# Patient Record
Sex: FEMALE | Race: WHITE | NOT HISPANIC OR LATINO | Employment: FULL TIME | URBAN - METROPOLITAN AREA
[De-identification: names, ages, dates, MRNs, and addresses within clinical notes are randomized per-mention and may not be internally consistent; named-entity substitution may affect disease eponyms.]

---

## 2017-06-26 ENCOUNTER — TRANSCRIBE ORDERS (OUTPATIENT)
Dept: ADMINISTRATIVE | Facility: HOSPITAL | Age: 57
End: 2017-06-26

## 2017-06-26 ENCOUNTER — TRANSCRIBE ORDERS (OUTPATIENT)
Dept: LAB | Facility: HOSPITAL | Age: 57
End: 2017-06-26

## 2017-06-26 ENCOUNTER — APPOINTMENT (OUTPATIENT)
Dept: LAB | Facility: HOSPITAL | Age: 57
End: 2017-06-26

## 2017-06-26 ENCOUNTER — HOSPITAL ENCOUNTER (OUTPATIENT)
Dept: GENERAL RADIOLOGY | Facility: HOSPITAL | Age: 57
Discharge: HOME | End: 2017-06-26
Attending: ORTHOPAEDIC SURGERY | Admitting: ORTHOPAEDIC SURGERY

## 2017-06-26 DIAGNOSIS — R73.09 OTHER ABNORMAL GLUCOSE: ICD-10-CM

## 2017-06-26 DIAGNOSIS — Z87.68 PERSONAL HISTORY OF PERINATAL PROBLEMS: Primary | ICD-10-CM

## 2017-06-26 DIAGNOSIS — Z01.818 PRE-OP EXAM: Primary | ICD-10-CM

## 2017-06-26 DIAGNOSIS — Z01.818 PRE-OP EXAM: ICD-10-CM

## 2017-06-26 DIAGNOSIS — Z01.818 PREOP EXAMINATION: ICD-10-CM

## 2017-06-26 LAB
ANION GAP SERPL CALCULATED.3IONS-SCNC: 9 MMOL/L (ref 3–11)
BUN BLD-MCNC: 12 MG/DL (ref 9–23)
BUN/CREAT SERPL: 20 (ref 7–25)
CALCIUM SPEC-SCNC: 9.6 MG/DL (ref 8.7–10.4)
CHLORIDE SERPL-SCNC: 107 MMOL/L (ref 99–109)
CO2 SERPL-SCNC: 27 MMOL/L (ref 20–31)
CREAT BLD-MCNC: 0.6 MG/DL (ref 0.6–1.3)
DEPRECATED RDW RBC AUTO: 46.9 FL (ref 37–54)
ERYTHROCYTE [DISTWIDTH] IN BLOOD BY AUTOMATED COUNT: 13.6 % (ref 11.3–14.5)
GFR SERPL CREATININE-BSD FRML MDRD: 103 ML/MIN/1.73
GLUCOSE BLD-MCNC: 93 MG/DL (ref 70–100)
HBA1C MFR BLD: 6.1 % (ref 4.8–5.6)
HCT VFR BLD AUTO: 40.2 % (ref 34.5–44)
HGB BLD-MCNC: 13 G/DL (ref 11.5–15.5)
MCH RBC QN AUTO: 30 PG (ref 27–31)
MCHC RBC AUTO-ENTMCNC: 32.3 G/DL (ref 32–36)
MCV RBC AUTO: 92.6 FL (ref 80–99)
PLATELET # BLD AUTO: 206 10*3/MM3 (ref 150–450)
PMV BLD AUTO: 10.1 FL (ref 6–12)
POTASSIUM BLD-SCNC: 4 MMOL/L (ref 3.5–5.5)
RBC # BLD AUTO: 4.34 10*6/MM3 (ref 3.89–5.14)
SODIUM BLD-SCNC: 143 MMOL/L (ref 132–146)
WBC NRBC COR # BLD: 5.72 10*3/MM3 (ref 3.5–10.8)

## 2017-06-26 PROCEDURE — 83036 HEMOGLOBIN GLYCOSYLATED A1C: CPT | Performed by: ORTHOPAEDIC SURGERY

## 2017-06-26 PROCEDURE — 93010 ELECTROCARDIOGRAM REPORT: CPT | Performed by: INTERNAL MEDICINE

## 2017-06-26 PROCEDURE — 80048 BASIC METABOLIC PNL TOTAL CA: CPT | Performed by: ORTHOPAEDIC SURGERY

## 2017-06-26 PROCEDURE — 36415 COLL VENOUS BLD VENIPUNCTURE: CPT | Performed by: ORTHOPAEDIC SURGERY

## 2017-06-26 PROCEDURE — 71020 HC CHEST PA AND LATERAL: CPT

## 2017-06-26 PROCEDURE — 93005 ELECTROCARDIOGRAM TRACING: CPT | Performed by: ORTHOPAEDIC SURGERY

## 2017-06-26 PROCEDURE — 85027 COMPLETE CBC AUTOMATED: CPT | Performed by: ORTHOPAEDIC SURGERY

## 2019-03-01 ENCOUNTER — TELEPHONE (OUTPATIENT)
Dept: INTERNAL MEDICINE | Facility: CLINIC | Age: 59
End: 2019-03-01

## 2019-03-01 RX ORDER — ESTRADIOL 0.05 MG/D
1 FILM, EXTENDED RELEASE TRANSDERMAL 2 TIMES WEEKLY
Qty: 8 PATCH | Refills: 6 | Status: SHIPPED | OUTPATIENT
Start: 2019-03-04 | End: 2019-06-20 | Stop reason: SDUPTHER

## 2019-03-01 RX ORDER — ESTRADIOL 0.05 MG/D
1 FILM, EXTENDED RELEASE TRANSDERMAL 2 TIMES WEEKLY
COMMUNITY
End: 2019-03-01 | Stop reason: SDUPTHER

## 2019-03-01 NOTE — TELEPHONE ENCOUNTER
PT CALLED AND SAID SHE LEFT MESSAGE Wednesday FOR REFILL FOR  ESTRADIOL PATCHES AND SHE IS OUT NOW AND NEEDS THEM TODAY. CALLED IN TO THE [facility] [phone]

## 2019-06-17 PROBLEM — I10 HYPERTENSION: Status: ACTIVE | Noted: 2019-06-17

## 2019-06-17 PROBLEM — Z79.890 HORMONE REPLACEMENT THERAPY: Status: ACTIVE | Noted: 2019-06-17

## 2019-06-17 PROBLEM — R19.6 HALITOSIS: Status: ACTIVE | Noted: 2019-06-17

## 2019-06-17 PROBLEM — E78.5 HYPERLIPIDEMIA: Status: ACTIVE | Noted: 2019-06-17

## 2019-06-17 PROBLEM — M25.511 PAIN OF RIGHT SHOULDER REGION: Status: ACTIVE | Noted: 2019-06-17

## 2019-06-20 ENCOUNTER — LAB (OUTPATIENT)
Dept: LAB | Facility: HOSPITAL | Age: 59
End: 2019-06-20

## 2019-06-20 ENCOUNTER — OFFICE VISIT (OUTPATIENT)
Dept: INTERNAL MEDICINE | Facility: CLINIC | Age: 59
End: 2019-06-20

## 2019-06-20 VITALS
BODY MASS INDEX: 30.37 KG/M2 | TEMPERATURE: 98.1 F | DIASTOLIC BLOOD PRESSURE: 70 MMHG | HEIGHT: 66 IN | SYSTOLIC BLOOD PRESSURE: 138 MMHG | WEIGHT: 189 LBS | HEART RATE: 68 BPM

## 2019-06-20 DIAGNOSIS — R53.83 FATIGUE, UNSPECIFIED TYPE: ICD-10-CM

## 2019-06-20 DIAGNOSIS — E55.9 VITAMIN D DEFICIENCY: ICD-10-CM

## 2019-06-20 DIAGNOSIS — Z00.00 ENCOUNTER FOR PREVENTATIVE ADULT HEALTH CARE EXAMINATION: ICD-10-CM

## 2019-06-20 DIAGNOSIS — Z78.0 POST-MENOPAUSAL: ICD-10-CM

## 2019-06-20 DIAGNOSIS — E78.2 MIXED HYPERLIPIDEMIA: ICD-10-CM

## 2019-06-20 DIAGNOSIS — F51.01 PRIMARY INSOMNIA: ICD-10-CM

## 2019-06-20 DIAGNOSIS — R23.2 HOT FLASHES: ICD-10-CM

## 2019-06-20 DIAGNOSIS — K21.9 GASTROESOPHAGEAL REFLUX DISEASE WITHOUT ESOPHAGITIS: ICD-10-CM

## 2019-06-20 DIAGNOSIS — I10 ESSENTIAL HYPERTENSION: ICD-10-CM

## 2019-06-20 DIAGNOSIS — F41.9 ANXIETY: ICD-10-CM

## 2019-06-20 DIAGNOSIS — E78.2 MIXED HYPERLIPIDEMIA: Primary | ICD-10-CM

## 2019-06-20 DIAGNOSIS — Z79.890 HORMONE REPLACEMENT THERAPY: ICD-10-CM

## 2019-06-20 DIAGNOSIS — E66.9 OBESITY (BMI 30-39.9): ICD-10-CM

## 2019-06-20 LAB
25(OH)D3 SERPL-MCNC: 22.9 NG/ML (ref 30–100)
ALBUMIN SERPL-MCNC: 4.7 G/DL (ref 3.5–5.2)
ALBUMIN/GLOB SERPL: 1.9 G/DL
ALP SERPL-CCNC: 88 U/L (ref 39–117)
ALT SERPL W P-5'-P-CCNC: 17 U/L (ref 1–33)
ANION GAP SERPL CALCULATED.3IONS-SCNC: 13.7 MMOL/L
AST SERPL-CCNC: 17 U/L (ref 1–32)
BASOPHILS # BLD AUTO: 0.05 10*3/MM3 (ref 0–0.2)
BASOPHILS NFR BLD AUTO: 1 % (ref 0–1.5)
BILIRUB SERPL-MCNC: 0.5 MG/DL (ref 0.2–1.2)
BUN BLD-MCNC: 10 MG/DL (ref 6–20)
BUN/CREAT SERPL: 15.9 (ref 7–25)
CALCIUM SPEC-SCNC: 9.5 MG/DL (ref 8.6–10.5)
CHLORIDE SERPL-SCNC: 101 MMOL/L (ref 98–107)
CHOLEST SERPL-MCNC: 133 MG/DL (ref 0–200)
CO2 SERPL-SCNC: 26.3 MMOL/L (ref 22–29)
CREAT BLD-MCNC: 0.63 MG/DL (ref 0.57–1)
DEPRECATED RDW RBC AUTO: 46.8 FL (ref 37–54)
EOSINOPHIL # BLD AUTO: 0.08 10*3/MM3 (ref 0–0.4)
EOSINOPHIL NFR BLD AUTO: 1.6 % (ref 0.3–6.2)
ERYTHROCYTE [DISTWIDTH] IN BLOOD BY AUTOMATED COUNT: 13.4 % (ref 12.3–15.4)
ESTRADIOL SERPL HS-MCNC: 38.1 PG/ML
GFR SERPL CREATININE-BSD FRML MDRD: 97 ML/MIN/1.73
GLOBULIN UR ELPH-MCNC: 2.5 GM/DL
GLUCOSE BLD-MCNC: 98 MG/DL (ref 65–99)
HCT VFR BLD AUTO: 44.2 % (ref 34–46.6)
HDLC SERPL-MCNC: 39 MG/DL (ref 40–60)
HGB BLD-MCNC: 14 G/DL (ref 12–15.9)
IMM GRANULOCYTES # BLD AUTO: 0.02 10*3/MM3 (ref 0–0.05)
IMM GRANULOCYTES NFR BLD AUTO: 0.4 % (ref 0–0.5)
LDLC SERPL CALC-MCNC: 69 MG/DL (ref 0–100)
LDLC/HDLC SERPL: 1.77 {RATIO}
LYMPHOCYTES # BLD AUTO: 1.8 10*3/MM3 (ref 0.7–3.1)
LYMPHOCYTES NFR BLD AUTO: 36.5 % (ref 19.6–45.3)
MCH RBC QN AUTO: 30.2 PG (ref 26.6–33)
MCHC RBC AUTO-ENTMCNC: 31.7 G/DL (ref 31.5–35.7)
MCV RBC AUTO: 95.5 FL (ref 79–97)
MONOCYTES # BLD AUTO: 0.36 10*3/MM3 (ref 0.1–0.9)
MONOCYTES NFR BLD AUTO: 7.3 % (ref 5–12)
NEUTROPHILS # BLD AUTO: 2.62 10*3/MM3 (ref 1.7–7)
NEUTROPHILS NFR BLD AUTO: 53.2 % (ref 42.7–76)
NRBC BLD AUTO-RTO: 0 /100 WBC (ref 0–0.2)
PLATELET # BLD AUTO: 210 10*3/MM3 (ref 140–450)
PMV BLD AUTO: 9.8 FL (ref 6–12)
POTASSIUM BLD-SCNC: 4.1 MMOL/L (ref 3.5–5.2)
PROT SERPL-MCNC: 7.2 G/DL (ref 6–8.5)
RBC # BLD AUTO: 4.63 10*6/MM3 (ref 3.77–5.28)
SODIUM BLD-SCNC: 141 MMOL/L (ref 136–145)
TRIGL SERPL-MCNC: 124 MG/DL (ref 0–150)
TSH SERPL DL<=0.05 MIU/L-ACNC: 1.68 MIU/ML (ref 0.27–4.2)
VIT B12 BLD-MCNC: 381 PG/ML (ref 211–946)
VLDLC SERPL-MCNC: 24.8 MG/DL (ref 5–40)
WBC NRBC COR # BLD: 4.93 10*3/MM3 (ref 3.4–10.8)

## 2019-06-20 PROCEDURE — 84443 ASSAY THYROID STIM HORMONE: CPT

## 2019-06-20 PROCEDURE — 93000 ELECTROCARDIOGRAM COMPLETE: CPT | Performed by: PHYSICIAN ASSISTANT

## 2019-06-20 PROCEDURE — 99396 PREV VISIT EST AGE 40-64: CPT | Performed by: PHYSICIAN ASSISTANT

## 2019-06-20 PROCEDURE — 82043 UR ALBUMIN QUANTITATIVE: CPT

## 2019-06-20 PROCEDURE — 82607 VITAMIN B-12: CPT

## 2019-06-20 PROCEDURE — 80061 LIPID PANEL: CPT

## 2019-06-20 PROCEDURE — 82306 VITAMIN D 25 HYDROXY: CPT

## 2019-06-20 PROCEDURE — 80053 COMPREHEN METABOLIC PANEL: CPT

## 2019-06-20 PROCEDURE — 85025 COMPLETE CBC W/AUTO DIFF WBC: CPT

## 2019-06-20 PROCEDURE — 82670 ASSAY OF TOTAL ESTRADIOL: CPT

## 2019-06-20 RX ORDER — AMOXICILLIN AND CLAVULANATE POTASSIUM 875; 125 MG/1; MG/1
1 TABLET, FILM COATED ORAL 2 TIMES DAILY
COMMUNITY
Start: 2019-06-19 | End: 2019-06-29

## 2019-06-20 RX ORDER — ZOLPIDEM TARTRATE 10 MG/1
0.5 TABLET, FILM COATED ORAL NIGHTLY PRN
COMMUNITY
Start: 2019-05-28

## 2019-06-20 RX ORDER — GLYCOPYRROLATE 1 MG/1
1 TABLET ORAL DAILY
Refills: 0 | COMMUNITY
Start: 2019-06-12 | End: 2021-06-28

## 2019-06-20 RX ORDER — ERGOCALCIFEROL 1.25 MG/1
1 CAPSULE ORAL WEEKLY
Refills: 0 | COMMUNITY
Start: 2019-04-29 | End: 2019-06-25 | Stop reason: SDUPTHER

## 2019-06-20 RX ORDER — HYDROXYZINE PAMOATE 25 MG/1
1 CAPSULE ORAL NIGHTLY
COMMUNITY
Start: 2019-05-24

## 2019-06-20 RX ORDER — ESTRADIOL 0.05 MG/D
1 FILM, EXTENDED RELEASE TRANSDERMAL 2 TIMES WEEKLY
Qty: 8 PATCH | Refills: 11 | Status: SHIPPED | OUTPATIENT
Start: 2019-06-20 | End: 2020-06-26 | Stop reason: SDUPTHER

## 2019-06-20 RX ORDER — CARVEDILOL 6.25 MG/1
1 TABLET ORAL DAILY
Refills: 1 | COMMUNITY
Start: 2019-06-17

## 2019-06-20 RX ORDER — FLUTICASONE PROPIONATE 50 MCG
2 SPRAY, SUSPENSION (ML) NASAL DAILY
Qty: 1 BOTTLE | Refills: 6 | Status: SHIPPED | OUTPATIENT
Start: 2019-06-20 | End: 2020-06-25

## 2019-06-20 RX ORDER — AMLODIPINE BESYLATE 5 MG/1
1 TABLET ORAL DAILY PRN
Refills: 0 | COMMUNITY
Start: 2019-06-10

## 2019-06-20 RX ORDER — FLUCONAZOLE 150 MG/1
1 TABLET ORAL AS NEEDED
COMMUNITY
Start: 2019-06-19 | End: 2021-06-28

## 2019-06-20 RX ORDER — ATORVASTATIN CALCIUM 80 MG/1
1 TABLET, FILM COATED ORAL NIGHTLY
Refills: 1 | COMMUNITY
Start: 2019-06-10

## 2019-06-20 RX ORDER — PAROXETINE 30 MG/1
1 TABLET, FILM COATED ORAL DAILY
COMMUNITY
Start: 2019-06-18 | End: 2020-06-26 | Stop reason: SDUPTHER

## 2019-06-20 NOTE — PATIENT INSTRUCTIONS

## 2019-06-20 NOTE — PROGRESS NOTES
Patient Care Team:  Kathryn Gillespie PA-C as PCP - General (Internal Medicine)    Chief Complaint::   Chief Complaint   Patient presents with   • Annual Exam     Patient is fasting         Subjective     HPI      Pt is here for annual physical.  She also sees Dr. Garcia in [place].   She has had colonoscopy 1/2019 with Dr. Victor Hugo Ospina.  She had recurrent sinusitis in the spring and had CT of sinuses, which showed 5 x 7mm polyp in the right maxillary sinus.  She took fluticasone and symptoms resolved completely.     LMP 2-3 years ago. Last pap and pelvic exam was 2018.   She is scheduled for MMG today.  She is due for DEXA now. We had switched high dose OCP to estradiol patch and oral progesterone.  This has worked well until recently for hot flashes.  She now has increase in hot flashes during the day.  We will check labs today.  She has had increase in weight.     Last year, discussed new onset of halitosis.  Pt describes as severe.  She has had full dental and ENT workup.  She reports an increase in stomach acid and reports a sour taste in her mouth.      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        Hypertension   This is a recurrent problem. The current episode started more than 1 year ago. The problem has been gradually improving since onset. Pertinent negatives include no blurred vision, chest pain, palpitations, peripheral edema or shortness of breath. Agents associated with hypertension include estrogens. Risk factors for coronary artery disease include obesity, dyslipidemia, post-menopausal state and sedentary lifestyle. Past treatments include direct vasodilators and beta blockers. Current antihypertension treatment includes direct vasodilators and beta blockers. The current treatment provides mild improvement. Compliance problems include diet and exercise.    Hyperlipidemia   This is a chronic problem. The current episode started more than 1 year ago. The problem  is controlled. Recent lipid tests were reviewed and are variable. Exacerbating diseases include obesity. Factors aggravating her hyperlipidemia include estrogen. Pertinent negatives include no chest pain, myalgias or shortness of breath. Current antihyperlipidemic treatment includes statins. The current treatment provides moderate improvement of lipids. Compliance problems include adherence to exercise and adherence to diet.  Risk factors for coronary artery disease include hypertension, obesity, a sedentary lifestyle, post-menopausal and dyslipidemia.       [facility]  The following portions of the patient's history were reviewed and updated as appropriate: allergies, current medications, past family history, past medical history, past social history, past surgical history and problem list.    Review of Systems:   Review of Systems   Constitutional: Positive for fatigue. Negative for activity change, appetite change, chills, diaphoresis, fever, unexpected weight gain and unexpected weight loss.   HENT: Positive for ear pain. Negative for congestion, hearing loss, sinus pressure and sore throat.    Eyes: Negative for blurred vision, double vision and visual disturbance.   Respiratory: Negative for cough, chest tightness, shortness of breath and wheezing.    Cardiovascular: Negative for chest pain, palpitations and leg swelling.   Gastrointestinal: Negative for abdominal pain, blood in stool, constipation, GERD and indigestion.   Endocrine: Negative for cold intolerance and heat intolerance.   Genitourinary: Negative for dysuria, hematuria, vaginal bleeding, breast lump and breast pain.   Musculoskeletal: Negative for arthralgias and myalgias.   Skin: Negative for color change and skin lesions.   Allergic/Immunologic: Negative for environmental allergies and food allergies.   Neurological: Negative for dizziness, tremors, seizures, syncope, speech difficulty, weakness, headache, memory problem and confusion.  "  Hematological: Negative for adenopathy. Does not bruise/bleed easily.   Psychiatric/Behavioral: Negative for decreased concentration, sleep disturbance and depressed mood. The patient is not nervous/anxious.        Vital Signs  Vitals:    06/20/19 1044 06/20/19 1159   BP: 142/78 138/70   BP Location: Left arm    Patient Position: Sitting    Cuff Size: Adult    Pulse: 68    Temp: 98.1 °F (36.7 °C)    TempSrc: Temporal    Weight: 85.7 kg (189 lb)    Height: 168.5 cm (66.34\")    PainSc: 0-No pain        Labs  No visits with results within 3 Month(s) from this visit.   Latest known visit with results is:   Transcribe Orders on 06/26/2017   Component Date Value Ref Range Status   • WBC 06/26/2017 5.72  3.50 - 10.80 10*3/mm3 Final   • RBC 06/26/2017 4.34  3.89 - 5.14 10*6/mm3 Final   • Hemoglobin 06/26/2017 13.0  11.5 - 15.5 g/dL Final   • Hematocrit 06/26/2017 40.2  34.5 - 44.0 % Final   • MCV 06/26/2017 92.6  80.0 - 99.0 fL Final   • MCH 06/26/2017 30.0  27.0 - 31.0 pg Final   • MCHC 06/26/2017 32.3  32.0 - 36.0 g/dL Final   • RDW 06/26/2017 13.6  11.3 - 14.5 % Final   • RDW-SD 06/26/2017 46.9  37.0 - 54.0 fl Final   • MPV 06/26/2017 10.1  6.0 - 12.0 fL Final   • Platelets 06/26/2017 206  150 - 450 10*3/mm3 Final   • Hemoglobin A1C 06/26/2017 6.10* 4.80 - 5.60 % Final   • Glucose 06/26/2017 93  70 - 100 mg/dL Final   • BUN 06/26/2017 12  9 - 23 mg/dL Final   • Creatinine 06/26/2017 0.60  0.60 - 1.30 mg/dL Final   • Sodium 06/26/2017 143  132 - 146 mmol/L Final   • Potassium 06/26/2017 4.0  3.5 - 5.5 mmol/L Final   • Chloride 06/26/2017 107  99 - 109 mmol/L Final   • CO2 06/26/2017 27.0  20.0 - 31.0 mmol/L Final   • Calcium 06/26/2017 9.6  8.7 - 10.4 mg/dL Final   • eGFR Non African Amer 06/26/2017 103  >60 mL/min/1.73 Final   • BUN/Creatinine Ratio 06/26/2017 20.0  7.0 - 25.0 Final   • Anion Gap 06/26/2017 9.0  3.0 - 11.0 mmol/L Final       Imaging  All imaging past 24 hours:   Imaging Results (last 24 hours)     ** " No results found for the last 24 hours. **            Current Outpatient Medications:   •  AMBIEN 10 MG tablet, Take 0.5 tablets by mouth At Night As Needed., Disp: , Rfl:   •  amLODIPine (NORVASC) 5 MG tablet, Take 1 tablet by mouth Daily As Needed., Disp: , Rfl: 0  •  amoxicillin-clavulanate (AUGMENTIN) 875-125 MG per tablet, Take 1 tablet by mouth 2 (Two) Times a Day., Disp: , Rfl:   •  atorvastatin (LIPITOR) 80 MG tablet, Take 1 tablet by mouth Every Night., Disp: , Rfl: 1  •  carvedilol (COREG) 6.25 MG tablet, Take 1 tablet by mouth Daily., Disp: , Rfl: 1  •  estradiol (MINIVELLE, VIVELLE-DOT) 0.05 MG/24HR patch, Place 1 patch on the skin as directed by provider 2 (Two) Times a Week., Disp: 8 patch, Rfl: 11  •  fluconazole (DIFLUCAN) 150 MG tablet, Take 1 tablet by mouth As Needed., Disp: , Rfl:   •  hydrOXYzine pamoate (VISTARIL) 25 MG capsule, Take 1 capsule by mouth Every Night., Disp: , Rfl:   •  PARoxetine (PAXIL) 30 MG tablet, Take 1 tablet by mouth Daily., Disp: , Rfl:   •  progesterone (PROMETRIUM) 200 MG capsule, Take 1 capsule by mouth Daily., Disp: 30 capsule, Rfl: 11  •  vitamin D (ERGOCALCIFEROL) 28896 units capsule capsule, Take 1 capsule by mouth 1 (One) Time Per Week., Disp: , Rfl: 0  •  fluticasone (FLONASE) 50 MCG/ACT nasal spray, 2 sprays into the nostril(s) as directed by provider Daily., Disp: 1 bottle, Rfl: 6  •  glycopyrrolate (ROBINUL) 1 MG tablet, Take 1 mg by mouth Daily., Disp: , Rfl: 0    Physical Exam:    Physical Exam   Constitutional: She is oriented to person, place, and time. She appears well-developed and well-nourished.   HENT:   Head: Normocephalic and atraumatic.   Right Ear: Hearing, tympanic membrane, external ear and ear canal normal.   Left Ear: Hearing, tympanic membrane, external ear and ear canal normal.   Nose: Nose normal.   Mouth/Throat: Uvula is midline, oropharynx is clear and moist and mucous membranes are normal.   Eyes: Conjunctivae, EOM and lids are normal.  Pupils are equal, round, and reactive to light.   Neck: Normal range of motion and full passive range of motion without pain. Neck supple.   Cardiovascular: Normal rate, regular rhythm, normal heart sounds and intact distal pulses.   Pulmonary/Chest: Effort normal and breath sounds normal.   Abdominal: Soft. Normal appearance and bowel sounds are normal.   Musculoskeletal: Normal range of motion. She exhibits no tenderness.   Neurological: She is alert and oriented to person, place, and time. She has normal strength and normal reflexes.   Skin: Skin is warm, dry and intact.   Psychiatric: She has a normal mood and affect. Her speech is normal and behavior is normal. Judgment and thought content normal. Cognition and memory are normal.   Vitals reviewed.        ECG 12 Lead  Date/Time: 6/20/2019 12:07 PM  Performed by: Kathryn Gillespie PA-C  Authorized by: Kathryn Gillespie PA-C   Comparison: compared with previous ECG from 6/27/2018  Similar to previous ECG  Rhythm: sinus rhythm  Rate: normal  Conduction: conduction normal    Clinical impression: normal ECG                Assessment/Plan   Problem List Items Addressed This Visit        Cardiovascular and Mediastinum    Hyperlipidemia - Primary    Relevant Medications    atorvastatin (LIPITOR) 80 MG tablet    Other Relevant Orders    Lipid Panel    Hypertension    Relevant Medications    amLODIPine (NORVASC) 5 MG tablet    carvedilol (COREG) 6.25 MG tablet    Other Relevant Orders    ECG 12 Lead    MicroAlbumin, Urine, Random - Urine, Clean Catch       Digestive    Obesity (BMI 30-39.9)    Current Assessment & Plan     BMI chart printed.  Discussed increasing exercise and following a low fat low calorie diet.         Vitamin D deficiency    Relevant Medications    vitamin D (ERGOCALCIFEROL) 80507 units capsule capsule       Other    Hormone replacement therapy    Overview     Changed         Relevant Medications    estradiol (MINIVELLE, VIVELLE-DOT) 0.05  MG/24HR patch    progesterone (PROMETRIUM) 200 MG capsule    Encounter for preventative adult health care examination    Current Assessment & Plan     MMG 6/20/2019  Colonoscopy 1/2019  DEXA ordered today  Hep A series completed in July 2018         Relevant Orders    ECG 12 Lead    Anxiety    Relevant Medications    PARoxetine (PAXIL) 30 MG tablet    Primary insomnia    Relevant Medications    hydrOXYzine pamoate (VISTARIL) 25 MG capsule    AMBIEN 10 MG tablet      Other Visit Diagnoses     Fatigue, unspecified type        Relevant Orders    CBC & Differential    Vitamin D 25 Hydroxy    Vitamin B12    Hot flashes        Labs today.  Consider adding estroven if labs are within normal range.    Relevant Orders    Comprehensive Metabolic Panel    TSH    Estradiol    Post-menopausal        DEXA ordered today    Relevant Orders    DEXA Bone Density Axial    Gastroesophageal reflux disease without esophagitis        Pt has had ENT workup and dental workup for halitosis.  She has acid reflux-trial of omeprazole.    Relevant Medications    glycopyrrolate (ROBINUL) 1 MG tablet          Return in about 1 year (around 6/20/2020) for Annual physical.    Plan of care reviewed with patient at the conclusion of today's visit. Education was provided regarding diagnosis, management, and any prescribed or recommended OTC medications.Patient verbalizes understanding of and agreement with management plan.     Kathryn Gillespie PA-C

## 2019-06-21 LAB — ALBUMIN UR-MCNC: <1.2 MG/L

## 2019-06-25 DIAGNOSIS — E55.9 VITAMIN D DEFICIENCY: ICD-10-CM

## 2019-06-25 RX ORDER — ERGOCALCIFEROL 1.25 MG/1
50000 CAPSULE ORAL WEEKLY
Qty: 4 CAPSULE | Refills: 3 | Status: SHIPPED | OUTPATIENT
Start: 2019-06-25 | End: 2019-10-11 | Stop reason: SDUPTHER

## 2019-07-23 ENCOUNTER — OFFICE VISIT (OUTPATIENT)
Dept: ORTHOPEDIC SURGERY | Facility: CLINIC | Age: 59
End: 2019-07-23

## 2019-07-23 VITALS — HEART RATE: 80 BPM | WEIGHT: 188.93 LBS | BODY MASS INDEX: 30.36 KG/M2 | OXYGEN SATURATION: 98 % | HEIGHT: 66 IN

## 2019-07-23 DIAGNOSIS — M79.645 THUMB PAIN, LEFT: Primary | ICD-10-CM

## 2019-07-23 PROCEDURE — 99214 OFFICE O/P EST MOD 30 MIN: CPT | Performed by: PHYSICIAN ASSISTANT

## 2019-07-23 PROCEDURE — 20600 DRAIN/INJ JOINT/BURSA W/O US: CPT | Performed by: PHYSICIAN ASSISTANT

## 2019-07-23 RX ADMIN — BETAMETHASONE SODIUM PHOSPHATE AND BETAMETHASONE ACETATE 3 MG: 3; 3 INJECTION, SUSPENSION INTRA-ARTICULAR; INTRALESIONAL; INTRAMUSCULAR; SOFT TISSUE at 14:36

## 2019-07-23 RX ADMIN — LIDOCAINE HYDROCHLORIDE 0.5 ML: 10 INJECTION, SOLUTION EPIDURAL; INFILTRATION; INTRACAUDAL; PERINEURAL at 14:36

## 2019-07-23 NOTE — PROGRESS NOTES
Procedure   Small Joint Arthrocentesis: L thumb CMC  Consent given by: patient  Site marked: site marked  Timeout: Immediately prior to procedure a time out was called to verify the correct patient, procedure, equipment, support staff and site/side marked as required   Supporting Documentation  Indications: pain   Procedure Details  Location: thumb - L thumb CMC  Preparation: Patient was prepped and draped in the usual sterile fashion  Needle size: 25 G  Approach: dorsal  Medications administered: 3 mg betamethasone acetate-betamethasone sodium phosphate 6 (3-3) MG/ML; 0.5 mL lidocaine PF 1% 1 %  Patient tolerance: patient tolerated the procedure well with no immediate complications

## 2019-07-23 NOTE — PROGRESS NOTES
[facility] [place] [facility] Clinic Note    Subjective     Chief Complaint   Patient presents with   • Left Hand - Pain        HPI  Jami Pennington is a 59 y.o. female.  Right-hand-dominant.  Patient presents orthopedic clinic for evaluation of left thumb pain.  She states is been ongoing for about 2 months.  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  She also notes positive nocturnal symptoms with throbbing.  At this time pain is 5 out of 10.  Severity the pain moderate.  Quality the pain throbbing.  No numbness or tingling.  No reported fever, chills, night sweats or other constitutional symptoms.  Only treatment has been use of anti-inflammatories.  Patient denies any locking or triggering to the thumb.      Past Medical History:   Diagnosis Date   • Hyperlipidemia     Per MD office progress note on 6/10/16   • Hypertension     Per MD office progress note on 6/10/16   • Overweight (BMI 25.0-29.9)     Per BMI and MD office note 6/10/16      Past Surgical History:   Procedure Laterality Date   • GALLBLADDER SURGERY     • SHOULDER SURGERY Left    • TONSILLECTOMY     • WRIST SURGERY Bilateral     CTR      Family History   Problem Relation Age of Onset   • No Known Problems Mother    • No Known Problems Father      Social History     Socioeconomic History   • Marital status:      Spouse name: Not on file   • Number of children: Not on file   • Years of education: Not on file   • Highest education level: Not on file   Tobacco Use   • Smoking status: Never Smoker   • Smokeless tobacco: Never Used   Substance and Sexual Activity   • Alcohol use: No   • Drug use: No      Current Outpatient Medications on File Prior to Visit   Medication Sig Dispense Refill   • AMBIEN 10 MG tablet Take 0.5 tablets by mouth At Night As Needed.     • amLODIPine (NORVASC) 5 MG tablet Take 1 tablet by mouth Daily  "As Needed.  0   • atorvastatin (LIPITOR) 80 MG tablet Take 1 tablet by mouth Every Night.  1   • carvedilol (COREG) 6.25 MG tablet Take 1 tablet by mouth Daily.  1   • estradiol (MINIVELLE, VIVELLE-DOT) 0.05 MG/24HR patch Place 1 patch on the skin as directed by provider 2 (Two) Times a Week. 8 patch 11   • fluconazole (DIFLUCAN) 150 MG tablet Take 1 tablet by mouth As Needed.     • fluticasone (FLONASE) 50 MCG/ACT nasal spray 2 sprays into the nostril(s) as directed by provider Daily. 1 bottle 6   • glycopyrrolate (ROBINUL) 1 MG tablet Take 1 mg by mouth Daily.  0   • hydrOXYzine pamoate (VISTARIL) 25 MG capsule Take 1 capsule by mouth Every Night.     • PARoxetine (PAXIL) 30 MG tablet Take 1 tablet by mouth Daily.     • progesterone (PROMETRIUM) 200 MG capsule Take 1 capsule by mouth Daily. 30 capsule 11   • vitamin D (ERGOCALCIFEROL) 65002 units capsule capsule Take 1 capsule by mouth 1 (One) Time Per Week. 4 capsule 3     No current facility-administered medications on file prior to visit.       Allergies   Allergen Reactions   • Ceftriaxone Sodium And Nacl Anaphylaxis and Shortness Of Breath     Rocephin   • Levofloxacin Anaphylaxis and Shortness Of Breath     Levaquin         The following portions of the patient's history were reviewed and updated as appropriate: allergies, current medications, past family history, past medical history, past social history, past surgical history and problem list.    Review of Systems   Constitutional: Negative.    HENT: Negative.    Eyes: Negative.    Respiratory: Negative.    Cardiovascular: Negative.    Gastrointestinal: Negative.    Endocrine: Negative.    Genitourinary: Negative.    Musculoskeletal: Positive for arthralgias.   Skin: Negative.    Allergic/Immunologic: Negative.    Neurological: Negative.    Hematological: Negative.    Psychiatric/Behavioral: Negative.         Objective      Physical Exam  Pulse 80   Ht 168.5 cm (66.34\")   Wt 85.7 kg (188 lb 15 oz)   " SpO2 98%   BMI 30.18 kg/m²     Body mass index is 30.18 kg/m².    GENERAL APPEARANCE: awake, alert & oriented x 3, in no acute distress and well developed, well nourished  PSYCH: normal mood and affect  LUNGS:  breathing nonlabored, no wheezing  EYES: sclera anicteric, pupils equal  CARDIOVASCULAR: palpable pulses dorsalis pedis, palpable posterior tibial bilaterally. Capillary refill less than 2 seconds  INTEGUMENTARY: skin intact, no clubbing, cyanosis  NEUROLOGIC:  Normal Sensation and reflexes         Ortho Exam  Peripheral Vascular   Bilateral Upper Extremity    No cyanotic nail beds    Pink nail beds and rapid capillary refill   Palpation    Radial Pulse - Bilaterally normal    Neurologic   Sensory: Light touch intact- Right and left hand    Left Upper Extremity    Left wrist extensors: 5/5    Left wrist flexors: 5/5    Left intrinsics: 5/5   Right Upper Extremity    Right wrist extensors: 5/5    Right wrist flexors: 5/5    Right intrinsics: 5/5    Musculoskeletal     Inspection and Palpation   Left Wrist      Tenderness -positive CMC, dorsum MCP and mild discomfort noted IPJ     Swelling - none    Crepitus - none    Muscle tone - no atrophy     Functional Testing:     Left Wrist and Thumb    Finklestein's: Negative    CMC shuck: Negative    CMC grind: Positive    CMC tenderness: Positive    A1 pulley: Very slight discomfort with more discomfort noted to the dorsum of the MCP.         Strength and Tone    Right  strength: good    Left  strength: good      Imaging/Studies  Imaging Results (last 7 days)     Procedure Component Value Units Date/Time    XR Hand 3+ View Left [[id number]] Resulted:  07/23/19 1436     Updated:  07/23/19 1440    Narrative:       Left Hand X-Ray    Indication: Pain    Views:  AP, Lateral, and Oblique     Comparison: none    Findings:  No fracture  Lucency seen at base of thumb distal phalanx.  Well circumscribed without   periosteal or soft tissue reaction.  Clinical  correlation is recommended.      Normal soft tissues  Normal joint spaces    Impression: See above for full discussion.            Ordered plain film imaging of the left hand.  Images reviewed by Dr. Hinton.  See chart for official results.      Assessment/Plan        ICD-10-CM ICD-9-CM   1. Thumb pain, left M79.645 729.5       Orders Placed This Encounter   Procedures   • Small Joint Arthrocentesis: L thumb CMC   • XR Hand 3+ View Left        -Left thumb pain especially at the CMC>MCP>IP.  -Offered and accepted a first CMC joint corticosteroid injection.  Injection given today.    -Depending on her results from this we may consider A1 pulley injection at a later date.  -Recommend she continue with over-the-counter pain medication as needed.  -Patient is currently utilizing Advil.  -Patient was provided a hand-based neoprene thumb spica brace today.  -Follow-up in 4 weeks for repeat evaluation, sooner if issues arise.  If she has continued pain to the without any change in symptomology may consider further imaging or additional injections.  -Questions and concerns answered.    After discussing the risks, benefits, indications of injection, the patient gave consent to proceed.  Her left thumb CMC joint was confirmed as the correct site to be injected with a timeout.  It was then prepped using Hibiclens and injected with a mixture of 1/2 cc of 1% plain lidocaine and 1/2 cc of Celestone without any resistance through the radial dorsal approach CMC joint, patient in seated position.  Area was cleaned, hemostasis was achieved and a Band-Aid was applied over the injection site.  The patient tolerated procedure well.  I instructed the patient on signs and symptoms of infection.  They should report to the emergency department or return to clinic if any of these develop, for further evaluation and treatment.  Recommended modifying activity for the next 48 hours to include rest, ice, elevation and oral pain medication as  needed.        Medical Decision Making  Management Options : over-the-counter medicine and prescription/IM medicine  Data/Risk: radiology tests       Michelle Amin PA-C  07/24/19  1:10 PM         EMR Dragon/Transcription disclaimer:  Much of this encounter note is an electronic transcription of spoken language to printed text. Electronic transcription of spoken language may permit erroneous, or at times, nonsensical words or phrases to be inadvertently transcribed. Although I have reviewed the note for such errors, some may still exist.

## 2019-07-24 RX ORDER — LIDOCAINE HYDROCHLORIDE 10 MG/ML
0.5 INJECTION, SOLUTION EPIDURAL; INFILTRATION; INTRACAUDAL; PERINEURAL
Status: COMPLETED | OUTPATIENT
Start: 2019-07-23 | End: 2019-07-23

## 2019-07-24 RX ORDER — BETAMETHASONE SODIUM PHOSPHATE AND BETAMETHASONE ACETATE 3; 3 MG/ML; MG/ML
3 INJECTION, SUSPENSION INTRA-ARTICULAR; INTRALESIONAL; INTRAMUSCULAR; SOFT TISSUE
Status: COMPLETED | OUTPATIENT
Start: 2019-07-23 | End: 2019-07-23

## 2019-08-14 ENCOUNTER — APPOINTMENT (OUTPATIENT)
Dept: BONE DENSITY | Facility: HOSPITAL | Age: 59
End: 2019-08-14

## 2019-08-29 ENCOUNTER — APPOINTMENT (OUTPATIENT)
Dept: BONE DENSITY | Facility: HOSPITAL | Age: 59
End: 2019-08-29

## 2019-09-03 ENCOUNTER — OFFICE VISIT (OUTPATIENT)
Dept: ORTHOPEDIC SURGERY | Facility: CLINIC | Age: 59
End: 2019-09-03

## 2019-09-03 VITALS — HEART RATE: 74 BPM | HEIGHT: 66 IN | BODY MASS INDEX: 30.47 KG/M2 | WEIGHT: 189.6 LBS | OXYGEN SATURATION: 98 %

## 2019-09-03 DIAGNOSIS — M25.649 THUMB JOINT STIFFNESS: ICD-10-CM

## 2019-09-03 DIAGNOSIS — M79.645 PAIN OF LEFT THUMB: Primary | ICD-10-CM

## 2019-09-03 DIAGNOSIS — M25.642 DECREASED RANGE OF MOTION OF LEFT THUMB: ICD-10-CM

## 2019-09-03 PROCEDURE — 20550 NJX 1 TENDON SHEATH/LIGAMENT: CPT | Performed by: PHYSICIAN ASSISTANT

## 2019-09-03 PROCEDURE — 99213 OFFICE O/P EST LOW 20 MIN: CPT | Performed by: PHYSICIAN ASSISTANT

## 2019-09-03 RX ADMIN — LIDOCAINE HYDROCHLORIDE 0.5 ML: 10 INJECTION, SOLUTION EPIDURAL; INFILTRATION; INTRACAUDAL; PERINEURAL at 15:57

## 2019-09-03 RX ADMIN — BETAMETHASONE SODIUM PHOSPHATE AND BETAMETHASONE ACETATE 3 MG: 3; 3 INJECTION, SUSPENSION INTRA-ARTICULAR; INTRALESIONAL; INTRAMUSCULAR; SOFT TISSUE at 15:57

## 2019-09-03 NOTE — PROGRESS NOTES
Procedure   Small Joint Arthrocentesis  Consent given by: patient  Site marked: site marked  Timeout: Immediately prior to procedure a time out was called to verify the correct patient, procedure, equipment, support staff and site/side marked as required   Supporting Documentation  Indications: pain   Procedure Details  Location: Left A1 Pulley   Preparation: Patient was prepped and draped in the usual sterile fashion  Needle size: 25 G  Approach: medial  Medications administered: 0.5 mL lidocaine PF 1% 1 %; 3 mg betamethasone acetate-betamethasone sodium phosphate 6 (3-3) MG/ML  Patient tolerance: patient tolerated the procedure well with no immediate complications

## 2019-09-05 RX ORDER — BETAMETHASONE SODIUM PHOSPHATE AND BETAMETHASONE ACETATE 3; 3 MG/ML; MG/ML
3 INJECTION, SUSPENSION INTRA-ARTICULAR; INTRALESIONAL; INTRAMUSCULAR; SOFT TISSUE
Status: COMPLETED | OUTPATIENT
Start: 2019-09-03 | End: 2019-09-03

## 2019-09-05 RX ORDER — LIDOCAINE HYDROCHLORIDE 10 MG/ML
0.5 INJECTION, SOLUTION EPIDURAL; INFILTRATION; INTRACAUDAL; PERINEURAL
Status: COMPLETED | OUTPATIENT
Start: 2019-09-03 | End: 2019-09-03

## 2019-10-11 DIAGNOSIS — E55.9 VITAMIN D DEFICIENCY: ICD-10-CM

## 2019-10-11 RX ORDER — ERGOCALCIFEROL 1.25 MG/1
CAPSULE ORAL
Qty: 4 CAPSULE | Refills: 3 | Status: SHIPPED | OUTPATIENT
Start: 2019-10-11 | End: 2020-06-26 | Stop reason: SDUPTHER

## 2019-11-05 ENCOUNTER — OFFICE VISIT (OUTPATIENT)
Dept: ORTHOPEDIC SURGERY | Facility: CLINIC | Age: 59
End: 2019-11-05

## 2019-11-05 VITALS — HEART RATE: 77 BPM | BODY MASS INDEX: 31.66 KG/M2 | WEIGHT: 197 LBS | HEIGHT: 66 IN | OXYGEN SATURATION: 97 %

## 2019-11-05 DIAGNOSIS — M79.645 PAIN OF LEFT THUMB: Primary | ICD-10-CM

## 2019-11-05 DIAGNOSIS — M65.312 TRIGGER FINGER OF LEFT THUMB: ICD-10-CM

## 2019-11-05 PROCEDURE — 99212 OFFICE O/P EST SF 10 MIN: CPT | Performed by: PHYSICIAN ASSISTANT

## 2019-11-05 NOTE — PROGRESS NOTES
"    [facility]opa[facility] Clinic Note    Subjective     CC: Follow-up (9 week recheck - Pain of left thumb; thumb joint stiffness; decreased ROM Left thumb)      VAMSHI Pennington is a 59 y.o. female.  She returns today for follow-up of her left thumb.  She had undergone left thumb first CMC corticosteroid injection 7/23/2019 followed by a left thumb A1 pulley injection 9/3/2019.  She reports that the second injection provided significant amount of relief.  Even though she never complained of true locking or triggering she did feel some popping and thickening along the A1 pulley which improved with the above-stated injection.  Currently she endorses a pain scale 2/10.  Severity the pain mild.  Quality the pain dull.  She is currently using Advil as needed for pain control.      ROS:    Constiutional:Pt denies fever, chills, nausea, or vomiting.  MSK:as above    Objective      Past Medical History  Past Medical History:   Diagnosis Date   • Hyperlipidemia     Per MD office progress note on 6/10/16   • Hypertension     Per MD office progress note on 6/10/16   • Overweight (BMI 25.0-29.9)     Per BMI and MD office note 6/10/16         Physical Exam  Pulse 77   Ht 168.5 cm (66.34\")   Wt 89.4 kg (197 lb)   SpO2 97%   BMI 31.47 kg/m²     Body mass index is 31.47 kg/m².    Patient is well nourished and well developed.        Ortho Exam  Left thumb  Skin: Grossly intact without any redness warmth or swelling.  Tenderness: Mild discomfort noted along the A1 pulley.  Negative pain noted first CMC joint.  Motion thumb: Patient has better flexion extension thumb CMC and IP joints today than what she had previously with decrease stiffness noted.  Testing: Finkelstein's negative, CMC grind negative, mild thickening noted thumb A1 pulley no catching or locking today.  Motor/sensory: Grossly intact C5-T1.        Imaging/Labs/EMG Reviewed:  No new imaging today.    Assessment:  1. Pain of left thumb    2. Trigger finger of " left thumb        Plan:  1. Left thumb pain due to stiffness along A1 pulley/trigger digit.  2. No injection today as patient is still doing well.  3. Continue with hand home exercise sheet.    4. Recommend over-the-counter pain medication as needed.  5. Will see her back in 6 weeks for possible repeat injection depending on how she is doing.  Or discussion of definitive treatment, trigger release.  6. Questions and concerns answered.      Michelle Amin PA-C  11/07/19  12:23 PM

## 2019-11-14 ENCOUNTER — APPOINTMENT (OUTPATIENT)
Dept: BONE DENSITY | Facility: HOSPITAL | Age: 59
End: 2019-11-14

## 2019-12-17 ENCOUNTER — OFFICE VISIT (OUTPATIENT)
Dept: ORTHOPEDIC SURGERY | Facility: CLINIC | Age: 59
End: 2019-12-17

## 2019-12-17 VITALS — BODY MASS INDEX: 31.5 KG/M2 | HEIGHT: 66 IN | OXYGEN SATURATION: 96 % | HEART RATE: 83 BPM | WEIGHT: 196 LBS

## 2019-12-17 DIAGNOSIS — S90.01XA CONTUSION OF RIGHT ANKLE, INITIAL ENCOUNTER: ICD-10-CM

## 2019-12-17 DIAGNOSIS — M25.571 ACUTE RIGHT ANKLE PAIN: Primary | ICD-10-CM

## 2019-12-17 DIAGNOSIS — S93.421A SPRAIN OF DELTOID LIGAMENT OF RIGHT ANKLE, INITIAL ENCOUNTER: ICD-10-CM

## 2019-12-17 PROCEDURE — 99213 OFFICE O/P EST LOW 20 MIN: CPT | Performed by: PHYSICIAN ASSISTANT

## 2019-12-17 RX ORDER — MELOXICAM 15 MG/1
TABLET ORAL
Qty: 90 TABLET | Refills: 2 | Status: SHIPPED | OUTPATIENT
Start: 2019-12-17 | End: 2020-06-25

## 2019-12-17 NOTE — PROGRESS NOTES
"    [facility] [place] [facility] Note    Subjective     CC: Pain of the Right Ankle (Had fall on 12/13/19) and Follow-up (6 week f/u Trigger finger of left thumb )      HPI    Jami Pennington is a 59 y.o. female.  Patient returns today for evaluation of her right ankle.  I had also previously seen her for her left thumb.  Regarding her left thumb it has improved with corticosteroid injection (A1 pulley).    With regards to her right ankle she reports about a month ago she twisted and then on 12/13/2019 on a rocking chair scraped down the medial aspect of her ankle resulting in significant pain.  She did have a history of an ankle fracture when she was around 18 years old that required casting.  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  Because the burning sensation noted along the medial aspect of the ankle now she was concern for development of RSD again.  At this time with regards to the ankle she endorses a pain scale of 6/10.  Severity the pain moderate.  Quality the pain throbbing, burning with some tingling that has improved.  Associated symptoms swelling, symptoms are worse with walking, standing, stair climbing it does affect her sleep.      ROS:    Constiutional:Pt denies fever, chills, nausea, or vomiting.  MSK:as above    Objective      Past Medical History  Past Medical History:   Diagnosis Date   • Hyperlipidemia     Per MD office progress note on 6/10/16   • Hypertension     Per MD office progress note on 6/10/16   • Overweight (BMI 25.0-29.9)     Per BMI and MD office note 6/10/16         Physical Exam  Pulse 83   Ht 168.5 cm (66.34\")   Wt 88.9 kg (196 lb)   SpO2 96%   Breastfeeding No   BMI 31.31 kg/m²     Body mass index is 31.31 kg/m².    Patient is well nourished and well developed.      Ortho Exam  Right ankle/foot  Skin: Grossly intact without erythema, warmth, swelling.  Tenderness: Positive tenderness noted medial aspect of the ankle into the foot/instep area.  No significant tenderness to ATFL, CFL. "  No tenderness noted to the base of the fifth.  Proximal tib-fib joint negative.    Motion: Dorsiflexion 10°, plantarflexion 40°, inversion 10°, eversion 10°.  Instability: Anterior drawer negative.  Talar tilt negative.  Forced external rotation negative.  Squeeze test negative.  Motor: Grossly intact Q/HS/TA/GS/EHL/P.  Sensory: Grossly intact to light touch DP/SP/S/S/T nerve distributions. Gossly intact L2-S1.  Vascular: 2+ DP/PT pulses with brisk capillary refill into each digit    Unable to demonstrate toe or heel walking secondary to pain.      Imaging/Labs/EMG Reviewed:  Ordered right ankle plain films.  Imaging read by Dr. Hinton.    Right Ankle X-Ray     Indication: Pain     Views: AP, Lateral, Mortise     Comparison: None     Findings:   No fracture  No bony lesion  Soft tissues normal  Normal joint spaces with mortise well-aligned, no evidence of syndesmosis widening     Impression: Negative right ankle x-ray for acute bony abnormality    Assessment:  1. Acute right ankle pain    2. Sprain of deltoid ligament of right ankle, initial encounter    3. Contusion of right ankle, initial encounter        Plan:  1. Acute right ankle pain due to sprain of deltoid along with contusion.  2. Patient was placed in a tall nonpneumatic boot today.  3. She was referred to formal PT.  4. Recommend over-the-counter pain medication as needed.  5. Discussed ice and elevation.  As well as gentle range of motion and stretching.  6. Follow-up in 6 weeks for repeat evaluation, sooner if issues arise or symptoms worsen/change.  With regards to her thumb she starts noticing pain to the A1 pulley again we discussed possible corticosteroid injection versus proceeding on with definitive treatment of trigger digit release.  7. Questions and concerns answered.      Michelle Amin PA-C  12/20/19  1:46 PM

## 2020-01-20 ENCOUNTER — TELEPHONE (OUTPATIENT)
Dept: INTERNAL MEDICINE | Facility: CLINIC | Age: 60
End: 2020-01-20

## 2020-01-20 NOTE — TELEPHONE ENCOUNTER
DEXA ordered 6/20/19. Was scheduled and cancelled on 8/14/19, 8/29/19 and 11/14/19. Next appt with you is 6/22/20. Can we cancel?

## 2020-01-31 ENCOUNTER — OFFICE VISIT (OUTPATIENT)
Dept: ORTHOPEDIC SURGERY | Facility: CLINIC | Age: 60
End: 2020-01-31

## 2020-01-31 VITALS — BODY MASS INDEX: 30.86 KG/M2 | OXYGEN SATURATION: 98 % | WEIGHT: 192 LBS | HEIGHT: 66 IN | HEART RATE: 93 BPM

## 2020-01-31 DIAGNOSIS — M25.571 ACUTE RIGHT ANKLE PAIN: Primary | ICD-10-CM

## 2020-01-31 DIAGNOSIS — R60.0 BILATERAL LOWER EXTREMITY EDEMA: ICD-10-CM

## 2020-01-31 PROCEDURE — 99213 OFFICE O/P EST LOW 20 MIN: CPT | Performed by: PHYSICIAN ASSISTANT

## 2020-01-31 NOTE — PROGRESS NOTES
"    [facility] [place] [facility] [place]        Subjective     CC: Follow-up of the Right Ankle (6 weeks- DOI:12/13/2019)      VAMSHI Pennington is a 59 y.o. female.  Patient returns for 6-week follow-up of her right medial ankle pain.  Once again she had a history of an ankle fracture 17+ years ago that resulted in RSD.  Patient was very concerned that she is developing it again.      After her last visit with me in December she was placed in a boot and referred to PT.  States she could not wear the boot because it caused increased tingling and burning to the medial aspect of her ankle.  Since discontinuing the boot that has improved.  Although, she reports that the medial ankle pain has subsequently worsened.  She been undergoing formal PT at least 2 times per week.  They have been doing K tape, e-stim and ultrasound without any resolution of pain symptoms.    Currently she endorses a pain scale of 8/10 which is increased from 6/10.  Severity the pain moderate.  Quality the pain throbbing, aching, burning.  Pain is all localized medial ankle, predominantly over medial malleolus with some radiation down into the longitudinal arch.  No other numbness or tingling is reported.      ROS:    Constiutional:Pt denies fever, chills, nausea, or vomiting.  MSK:as above        Objective      Past Medical History  Past Medical History:   Diagnosis Date   • Hyperlipidemia     Per MD office progress note on 6/10/16   • Hypertension     Per MD office progress note on 6/10/16   • Overweight (BMI 25.0-29.9)     Per BMI and MD office note 6/10/16         Physical Exam  Pulse 93   Ht 168.5 cm (66.34\")   Wt 87.1 kg (192 lb)   SpO2 98%   BMI 30.67 kg/m²     Body mass index is 30.67 kg/m².    Patient is well nourished and well developed.        Ortho Exam  V:  Dorsalis Pedis:  Right: 2+    Posterior Tibial: Right:2+    Capillary Refill:  Brisk  MSK:  Tibia:  Right:  non tender, normal motor function and positive lower extremity " edema; Left:  non tender, normal motor function and positive lower extremity edema      Ankle:  Right: tender Positive over medial malleolus, ROM  normal and motor function  painful      Foot:  Right:  tender positive medial/longitudinal arch, ROM  normal and motor function  painful      NEURO: Heel Walking:  Right:  unable to test; Left:  unable to test    Toe Walking:  Right:  unable to test; Left:  unable to test     Walkerton-Rai 5.07 monofilament test: normal    Lower extremity sensation: intact     Calf Atrophy:none    Motor Function: all 5/5      Except for increased pain noted along the medial malleolus no other symptoms exist indicating RSD: no redness, warmth, joint stiffness, abnormal hair growth/loss, glossy skin.      Imaging/Labs/EMG Reviewed:  Ordered right foot/ankle plain films.  Imaging read by Dr. Bernabe.    Imaging Results (Last 24 Hours)     Procedure Component Value Units Date/Time    XR Foot 2 View Right [[id number]] Resulted:  01/31/20 1317     Updated:  01/31/20 1317    Narrative:       Standing AP right foot, ordered for pain, shows no fractures, slight   narrowing of the midfoot joints, normal alignment, compared to 12/17/2019    XR Ankle 2 View Right [[id number]] Resulted:  01/31/20 1316     Updated:  01/31/20 1317    Narrative:       Standing AP lateral right ankle, ordered for pain, shows no fractures,   normal alignment, normal cartilage interval, compared to 12/17/2019          Assessment:  1. Acute right ankle pain    2. Bilateral lower extremity edema        Plan:  1. Patient with continued right medial ankle pain despite multiple conservative management treatments to include formal PT (more than 6 weeks), prescription NSAID (Mobic), attempted use of boot which exacerbated symptoms so it was DC'd.  2. Proceed with MRI for further evaluation.  3. Bilateral lower extremity edema--recommend compression socks.  Handout was given on where patient to purchase.  4. Also recommended  vitamin C 1500 mg daily.    5. Prescribed Voltaren gel.  6. Follow-up after MRI completed.  7. Questions and concerns answered.      Michelle Amin PA-C  01/31/20  1:38 PM

## 2020-02-12 ENCOUNTER — HOSPITAL ENCOUNTER (OUTPATIENT)
Dept: MRI IMAGING | Facility: HOSPITAL | Age: 60
Discharge: HOME | End: 2020-02-12
Admitting: PHYSICIAN ASSISTANT

## 2020-02-12 DIAGNOSIS — M25.571 ACUTE RIGHT ANKLE PAIN: ICD-10-CM

## 2020-02-12 PROCEDURE — 73721 MRI JNT OF LWR EXTRE W/O DYE: CPT

## 2020-02-14 ENCOUNTER — OFFICE VISIT (OUTPATIENT)
Dept: ORTHOPEDIC SURGERY | Facility: CLINIC | Age: 60
End: 2020-02-14

## 2020-02-14 VITALS — BODY MASS INDEX: 30.86 KG/M2 | OXYGEN SATURATION: 98 % | HEART RATE: 94 BPM | HEIGHT: 66 IN | WEIGHT: 192.02 LBS

## 2020-02-14 DIAGNOSIS — M25.473 SOFT TISSUE SWELLING OF ANKLE JOINT: ICD-10-CM

## 2020-02-14 DIAGNOSIS — M25.571 ACUTE RIGHT ANKLE PAIN: Primary | ICD-10-CM

## 2020-02-14 PROCEDURE — 99212 OFFICE O/P EST SF 10 MIN: CPT | Performed by: PHYSICIAN ASSISTANT

## 2020-02-14 NOTE — PROGRESS NOTES
"    [facility] Clinic Note        Subjective     CC: Follow-up of the Right Ankle (After MRI 02/12/2020)      VAMSHI Pennington is a 59 y.o. female.  Patient reports for MRI follow-up right ankle.  She has continued to work with physical therapy and still notes pain along the medial aspect of the ankle.  At this time she endorses a pain scale of 4/10 which has improved slightly from her previous visit.  She did have a few days of none to minimal pain but after her prolonged walking pain returned.    Patient reports she has been taking vitamin C as well as using the Voltaren gel.    ROS:    Constiutional:Pt denies fever, chills, nausea, or vomiting.  MSK:as above        Objective      Past Medical History  Past Medical History:   Diagnosis Date   • Hyperlipidemia     Per MD office progress note on 6/10/16   • Hypertension     Per MD office progress note on 6/10/16   • Overweight (BMI 25.0-29.9)     Per BMI and MD office note 6/10/16         Physical Exam  Pulse 94   Ht 168.5 cm (66.34\")   Wt 87.1 kg (192 lb 0.3 oz)   SpO2 98%   BMI 30.68 kg/m²     Body mass index is 30.68 kg/m².    Patient is well nourished and well developed.        Ortho Exam  No change in exam from 1/31/2020.  Patient continues to have tenderness over medial malleolus predominantly.      Imaging/Labs/EMG Reviewed:  Dr. Bernabe and I reviewed MRI performed on 2/12/2020.    EXAMINATION: MRI ANKLE RIGHT WO CONTRAST- 02/12/2020     INDICATION: Persistent pain along medial ankle.; M25.571-Pain in right  ankle and joints of right foot      TECHNIQUE: Multiplanar MRI of the ankle without intravenous contrast     COMPARISON: Radiographs dated 01/31/2020     FINDINGS: Osseous structures demonstrate normal bone marrow signal  without aggressive bone marrow signal findings or evidence for acute  fracture/acute cortical irregularity.     Achilles tendon within normal limits for caliber and signal.  Anterior/extensor tendons unremarkable. " Tendons unremarkable with normal  appearance without tearing or abnormal fluid associated. Medial tendons  in particular posterior tibialis and flexor hallucis longus demonstrate  increased signal adjacent to the medial malleolus region without  significant adjacent osteophytic spurring concerning for tenosynovitis  as well as questionable tendinopathy of these progressed distally  without evidence for obvious split tearing or gross intrinsic  tearing/irregularity. Edema about the medial malleolus soft tissues  adjacent. Increased signal within the anterior deltoid ligament  primarily the superficial tibiocalcaneal and tibionavicular ligaments  noted of low-grade injury/sprain without discontinuity or retraction..     IMPRESSION:  Increased fluid signal within the tendon sheath and minimal  increased signal involving the posterior tibialis tendon consistent with  tenosynovitis and/or tendinopathy however no evidence for tearing or  discontinuity. Additionally greater than expected fluid along the  posterior lateral margin flexor digitorum longus concerning for  tenosynovitis associated. Soft tissue edema about the medial malleolus  however no cortical irregularity to suggest fracture with minimal  osteophyte formation may represent prior injury. Increased signal within  the anterior deltoid ligament primarily the superficial tibiocalcaneal  and tibionavicular ligaments noted of low-grade injury/sprain without  discontinuity or retraction.      D:  02/12/2020  E:  02/12/2020     This report was finalized on 2/12/2020 6:45 PM by Dr. Jamel Dee.      Assessment:  1. Acute right ankle pain    2. Soft tissue swelling of ankle joint        Plan:  1. Continued right medial ankle pain and soft tissue swelling.    2. Patient to continue with Voltaren gel and vitamin C.  3. To continue working with formal PT.  4. Once again recommend compression stockings.  5. Provided prescription for custom-made orthotics..  6. Follow-up  3 months.  7. Questions and concerns answered.      Michelle Amin PA-C  02/17/20  2:31 PM

## 2020-02-19 ENCOUNTER — TELEPHONE (OUTPATIENT)
Dept: INTERNAL MEDICINE | Facility: CLINIC | Age: 60
End: 2020-02-19

## 2020-02-19 NOTE — TELEPHONE ENCOUNTER
Needs a copy of most recent Pap results if we have.  I did not see in Epic so will check in [facility] & call her back.      Found pap results in [employer] [facility] dated 6- & faxed to [phone].

## 2020-05-22 ENCOUNTER — OFFICE VISIT (OUTPATIENT)
Dept: ORTHOPEDIC SURGERY | Facility: CLINIC | Age: 60
End: 2020-05-22

## 2020-05-22 DIAGNOSIS — M25.473 SOFT TISSUE SWELLING OF ANKLE JOINT: ICD-10-CM

## 2020-05-22 DIAGNOSIS — M25.571 ACUTE RIGHT ANKLE PAIN: Primary | ICD-10-CM

## 2020-05-22 PROCEDURE — 99441 PR PHYS/QHP TELEPHONE EVALUATION 5-10 MIN: CPT | Performed by: PHYSICIAN ASSISTANT

## 2020-05-22 NOTE — PROGRESS NOTES
[facility] [place] [facility] Note        Subjective     CC: Follow-up (3 month follow up; Acute right ankle pain)    You have chosen to receive care through a telephone visit. Do you consent to use a telephone visit for your medical care today? Yes    HPI    Jami Pennington is a 60 y.o. female.  Telephone encounter for follow-up evaluation right medial ankle pain.  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  She is continue to use vitamin C as well as Voltaren gel.  She also has a compression socks.  Because of the coronavirus pandemic restrictions she has not been able to get back to [place] for the orthotics.  She is restarting physical therapy in a couple of weeks.      ROS:    Constiutional:Pt denies fever, chills, nausea, or vomiting.  MSK:as above        Objective      Past Medical History  Past Medical History:   Diagnosis Date   • Hyperlipidemia     Per MD office progress note on 6/10/16   • Hypertension     Per MD office progress note on 6/10/16   • Overweight (BMI 25.0-29.9)     Per BMI and MD office note 6/10/16         Physical Exam  There were no vitals taken for this visit.    There is no height or weight on file to calculate BMI.    Patient is well nourished and well developed.        Ortho Exam  Telephone encounter, no exam.      Imaging/Labs/EMG Reviewed:  No new imaging today.      Assessment:  1. Acute right ankle pain    2. Soft tissue swelling of ankle joint        Plan:  1. Improved right ankle pain/soft tissue swelling per patient.  2. Restarting formal PT.  3. She will continue with vitamin C, Voltaren gel and compression stockings.  4. When she is able to get to [place] she will get the custom-made orthotics.  5. I did offer the patient a follow-up appointment but she stated she will contact the clinic and follow-up if symptoms  fail to continue to improve.  6. Questions and concerns answered.    This visit has been rescheduled as a phone visit to comply with patient safety concerns in accordance with CDC recommendations. Total time of discussion was 5:42 minutes.      Michelle Amin PA-C  05/22/20  11:16

## 2020-06-25 ENCOUNTER — TELEMEDICINE (OUTPATIENT)
Dept: INTERNAL MEDICINE | Facility: CLINIC | Age: 60
End: 2020-06-25

## 2020-06-25 DIAGNOSIS — I10 ESSENTIAL HYPERTENSION: ICD-10-CM

## 2020-06-25 DIAGNOSIS — R73.09 ABNORMAL GLUCOSE: ICD-10-CM

## 2020-06-25 DIAGNOSIS — Z78.0 POST-MENOPAUSAL: ICD-10-CM

## 2020-06-25 DIAGNOSIS — E78.2 MIXED HYPERLIPIDEMIA: ICD-10-CM

## 2020-06-25 DIAGNOSIS — E55.9 VITAMIN D DEFICIENCY: Primary | ICD-10-CM

## 2020-06-25 DIAGNOSIS — Z79.890 HORMONE REPLACEMENT THERAPY: ICD-10-CM

## 2020-06-25 DIAGNOSIS — F51.01 PRIMARY INSOMNIA: ICD-10-CM

## 2020-06-25 DIAGNOSIS — Z13.820 SCREENING FOR OSTEOPOROSIS: ICD-10-CM

## 2020-06-25 PROCEDURE — 99214 OFFICE O/P EST MOD 30 MIN: CPT | Performed by: PHYSICIAN ASSISTANT

## 2020-06-25 NOTE — PROGRESS NOTES
Patient Care Team:  Kathryn Gillespie PA-C as PCP - General (Internal Medicine)    Chief Complaint::   Chief Complaint   Patient presents with   • Hypertension   • Obesity   • Hyperlipidemia      You have chosen to receive care through a telehealth visit.  Do you consent to use a video/audio connection for your medical care today? YES    Subjective     HPI  60 year old female presents for 6 month check up on hypertension, hyperlipidemia, obesity, and HRT. Pt has been placed on blood pressure medication . <130/80. May have gained 2-3 pounds over the pandemic.   Uses therabreath for halotosis and works well.        The following portions of the patient's history were reviewed and updated as appropriate: active problem list, medication list, allergies, family history, social history    Review of Systems:   Review of Systems   Constitutional: Positive for activity change and appetite change. Negative for unexpected weight gain and unexpected weight loss.   Cardiovascular: Negative for chest pain.   Gastrointestinal: Negative for abdominal distention, abdominal pain, diarrhea and nausea.   Psychiatric/Behavioral: Negative for dysphoric mood. The patient is not nervous/anxious.        Vital Signs  There were no vitals filed for this visit.  There is no height or weight on file to calculate BMI.    Labs  No visits with results within 3 Month(s) from this visit.   Latest known visit with results is:   Lab on 06/20/2019   Component Date Value Ref Range Status   • Glucose 06/20/2019 98  65 - 99 mg/dL Final   • BUN 06/20/2019 10  6 - 20 mg/dL Final   • Creatinine 06/20/2019 0.63  0.57 - 1.00 mg/dL Final   • Sodium 06/20/2019 141  136 - 145 mmol/L Final   • Potassium 06/20/2019 4.1  3.5 - 5.2 mmol/L Final   • Chloride 06/20/2019 101  98 - 107 mmol/L Final   • CO2 06/20/2019 26.3  22.0 - 29.0 mmol/L Final   • Calcium 06/20/2019 9.5  8.6 - 10.5 mg/dL Final   • Total Protein 06/20/2019 7.2  6.0 - 8.5 g/dL Final   • Albumin  06/20/2019 4.70  3.50 - 5.20 g/dL Final   • ALT (SGPT) 06/20/2019 17  1 - 33 U/L Final   • AST (SGOT) 06/20/2019 17  1 - 32 U/L Final   • Alkaline Phosphatase 06/20/2019 88  39 - 117 U/L Final   • Total Bilirubin 06/20/2019 0.5  0.2 - 1.2 mg/dL Final   • eGFR Non African Amer 06/20/2019 97  >60 mL/min/1.73 Final   • Globulin 06/20/2019 2.5  gm/dL Final   • A/G Ratio 06/20/2019 1.9  g/dL Final   • BUN/Creatinine Ratio 06/20/2019 15.9  7.0 - 25.0 Final   • Anion Gap 06/20/2019 13.7  mmol/L Final   • Total Cholesterol 06/20/2019 133  0 - 200 mg/dL Final   • Triglycerides 06/20/2019 124  0 - 150 mg/dL Final   • HDL Cholesterol 06/20/2019 39* 40 - 60 mg/dL Final   • LDL Cholesterol  06/20/2019 69  0 - 100 mg/dL Final   • VLDL Cholesterol 06/20/2019 24.8  5 - 40 mg/dL Final   • LDL/HDL Ratio 06/20/2019 1.77   Final   • Microalbumin, Urine 06/20/2019 <1.2  mg/L Final   • 25 Hydroxy, Vitamin D 06/20/2019 22.9* 30.0 - 100.0 ng/ml Final   • TSH 06/20/2019 1.680  0.270 - 4.200 mIU/mL Final   • Vitamin B-12 06/20/2019 381  211 - 946 pg/mL Final   • Estradiol 06/20/2019 38.1  pg/mL Final   • WBC 06/20/2019 4.93  3.40 - 10.80 10*3/mm3 Final   • RBC 06/20/2019 4.63  3.77 - 5.28 10*6/mm3 Final   • Hemoglobin 06/20/2019 14.0  12.0 - 15.9 g/dL Final   • Hematocrit 06/20/2019 44.2  34.0 - 46.6 % Final   • MCV 06/20/2019 95.5  79.0 - 97.0 fL Final   • MCH 06/20/2019 30.2  26.6 - 33.0 pg Final   • MCHC 06/20/2019 31.7  31.5 - 35.7 g/dL Final   • RDW 06/20/2019 13.4  12.3 - 15.4 % Final   • RDW-SD 06/20/2019 46.8  37.0 - 54.0 fl Final   • MPV 06/20/2019 9.8  6.0 - 12.0 fL Final   • Platelets 06/20/2019 210  140 - 450 10*3/mm3 Final   • Neutrophil % 06/20/2019 53.2  42.7 - 76.0 % Final   • Lymphocyte % 06/20/2019 36.5  19.6 - 45.3 % Final   • Monocyte % 06/20/2019 7.3  5.0 - 12.0 % Final   • Eosinophil % 06/20/2019 1.6  0.3 - 6.2 % Final   • Basophil % 06/20/2019 1.0  0.0 - 1.5 % Final   • Immature Grans % 06/20/2019 0.4  0.0 - 0.5 %  Final   • Neutrophils, Absolute 06/20/2019 2.62  1.70 - 7.00 10*3/mm3 Final   • Lymphocytes, Absolute 06/20/2019 1.80  0.70 - 3.10 10*3/mm3 Final   • Monocytes, Absolute 06/20/2019 0.36  0.10 - 0.90 10*3/mm3 Final   • Eosinophils, Absolute 06/20/2019 0.08  0.00 - 0.40 10*3/mm3 Final   • Basophils, Absolute 06/20/2019 0.05  0.00 - 0.20 10*3/mm3 Final   • Immature Grans, Absolute 06/20/2019 0.02  0.00 - 0.05 10*3/mm3 Final   • nRBC 06/20/2019 0.0  0.0 - 0.2 /100 WBC Final       Imaging  No radiology results for the last 30 days.      Current Outpatient Medications:   •  AMBIEN 10 MG tablet, Take 0.5 tablets by mouth At Night As Needed., Disp: , Rfl:   •  amLODIPine (NORVASC) 5 MG tablet, Take 1 tablet by mouth Daily As Needed., Disp: , Rfl: 0  •  atorvastatin (LIPITOR) 80 MG tablet, Take 1 tablet by mouth Every Night., Disp: , Rfl: 1  •  carvedilol (COREG) 6.25 MG tablet, Take 1 tablet by mouth Daily., Disp: , Rfl: 1  •  diclofenac (VOLTAREN) 1 % gel gel, Apply 4 g topically to the appropriate area as directed 2 (Two) Times a Day., Disp: 1 tube, Rfl: 5  •  [START ON 6/29/2020] estradiol (MINIVELLE, VIVELLE-DOT) 0.05 MG/24HR patch, Place 1 patch on the skin as directed by provider 2 (Two) Times a Week., Disp: 8 patch, Rfl: 5  •  fluconazole (DIFLUCAN) 150 MG tablet, Take 1 tablet by mouth As Needed., Disp: , Rfl:   •  glycopyrrolate (ROBINUL) 1 MG tablet, Take 1 mg by mouth Daily., Disp: , Rfl: 0  •  hydrOXYzine pamoate (VISTARIL) 25 MG capsule, Take 1 capsule by mouth Every Night., Disp: , Rfl:   •  PARoxetine (PAXIL) 30 MG tablet, Take 1 tablet by mouth Daily., Disp: 30 tablet, Rfl: 5  •  progesterone (PROMETRIUM) 200 MG capsule, Take 1 capsule by mouth Daily., Disp: 30 capsule, Rfl: 11  •  vitamin D (ERGOCALCIFEROL) 1.25 MG (94353 UT) capsule capsule, Take 1 capsule by mouth 1 (One) Time Per Week., Disp: 4 capsule, Rfl: 5    Physical Exam:    Physical Exam   Constitutional: She is oriented to person, place, and  time. She appears well-developed and well-nourished.   HENT:   Head: Normocephalic and atraumatic.   Eyes: Pupils are equal, round, and reactive to light. Conjunctivae and EOM are normal.   Neck: Normal range of motion.   Pulmonary/Chest: No respiratory distress.   Neurological: She is alert and oriented to person, place, and time.   Skin: No erythema. No pallor.   Psychiatric: She has a normal mood and affect. Her behavior is normal. Judgment and thought content normal.   Nursing note and vitals reviewed.      Procedures        Assessment/Plan   Problem List Items Addressed This Visit        Cardiovascular and Mediastinum    Hyperlipidemia    Overview     Continue atorvastatin 80mg daily.         Current Assessment & Plan     Lipid abnormalities are improving with treatment.  Nutritional counseling was provided. and Pharmacotherapy as ordered.  Lipids will be reassessed in 6 months.         Relevant Medications    atorvastatin (LIPITOR) 80 MG tablet    Other Relevant Orders    Lipid Panel    Hypertension    Current Assessment & Plan     Hypertension is improving with treatment.  Continue current treatment regimen.  Dietary sodium restriction.  Weight loss.  Regular aerobic exercise.  Continue current medications.  Ambulatory blood pressure monitoring.  Blood pressure will be reassessed at the next regular appointment.         Relevant Medications    amLODIPine (NORVASC) 5 MG tablet    carvedilol (COREG) 6.25 MG tablet    Other Relevant Orders    CBC & Differential    MicroAlbumin, Urine, Random - Urine, Clean Catch       Digestive    Vitamin D deficiency - Primary    Relevant Medications    vitamin D (ERGOCALCIFEROL) 1.25 MG (76876 UT) capsule capsule    Other Relevant Orders    Vitamin D 25 Hydroxy       Other    Hormone replacement therapy    Overview     Continue minivelle as directed  Continue progesterone 200mg nightly.         Relevant Medications    progesterone (PROMETRIUM) 200 MG capsule    estradiol  (MINIVELLE, VIVELLE-DOT) 0.05 MG/24HR patch (Start on 6/29/2020)    Primary insomnia    Relevant Medications    hydrOXYzine pamoate (VISTARIL) 25 MG capsule    AMBIEN 10 MG tablet    Other Relevant Orders    T4, Free    TSH      Other Visit Diagnoses     Post-menopausal        Relevant Orders    DEXA Bone Density Axial    Abnormal glucose        Relevant Orders    Comprehensive Metabolic Panel    Hemoglobin A1c        This visit has been rescheduled as a phone visit to comply with patient safety concerns in accordance with CDC recommendations. Total time of discussion was 35 minutes.    Return in about 6 months (around 12/25/2020) for Recheck.    Plan of care reviewed with patient at the conclusion of today's visit. Education was provided regarding diagnosis, management, and any prescribed or recommended OTC medications.Patient verbalizes understanding of and agreement with management plan.       Kathryn Gillespie PA-C    Please note that portions of this note were completed with a voice recognition program. Efforts were made to edit the dictations, but occasionally words are mistranscribed.

## 2020-06-26 DIAGNOSIS — Z79.890 HORMONE REPLACEMENT THERAPY: ICD-10-CM

## 2020-06-26 DIAGNOSIS — F41.9 ANXIETY: ICD-10-CM

## 2020-06-26 DIAGNOSIS — E55.9 VITAMIN D DEFICIENCY: ICD-10-CM

## 2020-06-26 RX ORDER — ERGOCALCIFEROL 1.25 MG/1
50000 CAPSULE ORAL WEEKLY
Qty: 4 CAPSULE | Refills: 5 | Status: SHIPPED | OUTPATIENT
Start: 2020-06-26 | End: 2021-06-28

## 2020-06-26 RX ORDER — PAROXETINE 30 MG/1
30 TABLET, FILM COATED ORAL DAILY
Qty: 30 TABLET | Refills: 5 | Status: SHIPPED | OUTPATIENT
Start: 2020-06-26

## 2020-06-26 RX ORDER — ESTRADIOL 0.05 MG/D
1 FILM, EXTENDED RELEASE TRANSDERMAL 2 TIMES WEEKLY
Qty: 8 PATCH | Refills: 5 | Status: SHIPPED | OUTPATIENT
Start: 2020-06-29 | End: 2021-06-28 | Stop reason: SDUPTHER

## 2020-06-26 NOTE — TELEPHONE ENCOUNTER
Patient had visit yesterday and the following medications did not get called in for her. Please call in today if possible. RX closes at 6 and not open on the weekend:  PAXIL 30 MG  ESTRADIOL PATCH  VITAMIN D    [facility] [place]

## 2020-06-28 NOTE — ASSESSMENT & PLAN NOTE
Obesity is unchanged.  Discussed the patient's BMI.  The BMI is above average; BMI management plan is completed.  General weight loss/lifestyle modification strategies discussed (elicit support from others; identify saboteurs; non-food rewards, etc).  Diet interventions: low calorie (1000 kCal/d) deficit diet.  Informal exercise measures discussed, e.g. taking stairs instead of elevator.  Regular aerobic exercise program discussed.

## 2020-06-28 NOTE — PATIENT INSTRUCTIONS

## 2020-09-04 DIAGNOSIS — Z79.890 HORMONE REPLACEMENT THERAPY: ICD-10-CM

## 2020-09-04 NOTE — TELEPHONE ENCOUNTER
Caller: Jami Pennington    Relationship: Self    Best call back number: [phone]  Medication needed:   Requested Prescriptions     Pending Prescriptions Disp Refills   • progesterone (PROMETRIUM) 200 MG capsule [Pharmacy Med Name: PROGESTERONE 200MG CAPSULES] 90 capsule      Sig: TAKE 1 CAPSULE BY MOUTH ONCE DAILY       When do you need the refill by: ASAP    What details did the patient provide when requesting the medication: OUT OF MEDICATION. PATIENT STATES SHE WAS SEEN IN MAY AND THOUGHT YOU WOULD GET A 1 YEAR SUPPLY OF THIS MEDICATION.  THE PATIENT IS REQUESTING A 90 DAY SUPPLY WITH A YEAR OF REFILLS.   Does the patient have less than a 3 day supply:  [x] Yes  [] No    What is the patient's preferred [id number] [address] & [facility] - [phone] [facility] [phone] FX     PLEASE CALL PATIENT IF ANY QUESTIONS OR CONCERNS -[phone]

## 2020-11-06 ENCOUNTER — TELEPHONE (OUTPATIENT)
Dept: INTERNAL MEDICINE | Facility: CLINIC | Age: 60
End: 2020-11-06

## 2020-11-06 NOTE — TELEPHONE ENCOUNTER
PATIENT CALLED AND STATED THE ORDER FOR HER BONE DENSITY NEEDS TO BE CORRECTED AND RE-SENT BEFORE HER INSURANCE WILL COVER.  THE ORDER NEEDS TO STATE THAT IT IS A SCREENING BONE DENSITY.    PLEASE CONTACT PATIENT TO ADVISE.    CALLBACK:  [phone]

## 2020-11-11 ENCOUNTER — APPOINTMENT (OUTPATIENT)
Dept: BONE DENSITY | Facility: HOSPITAL | Age: 60
End: 2020-11-11

## 2021-02-09 ENCOUNTER — TELEPHONE (OUTPATIENT)
Dept: INTERNAL MEDICINE | Facility: CLINIC | Age: 61
End: 2021-02-09

## 2021-03-01 ENCOUNTER — APPOINTMENT (OUTPATIENT)
Dept: BONE DENSITY | Facility: HOSPITAL | Age: 61
End: 2021-03-01

## 2021-03-05 ENCOUNTER — TELEPHONE (OUTPATIENT)
Dept: INTERNAL MEDICINE | Facility: CLINIC | Age: 61
End: 2021-03-05

## 2021-06-16 ENCOUNTER — TELEPHONE (OUTPATIENT)
Dept: INTERNAL MEDICINE | Facility: CLINIC | Age: 61
End: 2021-06-16

## 2021-06-16 NOTE — TELEPHONE ENCOUNTER
Caller: Jami Pennington    Relationship: Self    Best call back number: [phone]    What orders are you requesting (i.e. lab or imaging): LABS    In what timeframe would the patient need to come in: ON OR BEFORE 06/28/21    Where will you receive your lab/imaging services: IN OFFICE    Additional notes: PATIENT LIVES FAR AWAY AND DOES NOT WANT TO FAST TILE 2:30PM. PATIENT IS REQUESTING A LAB ORDER SO SHE CAN GET THEM DONE PRIOR TO HER APPOINTMENT    PLEASE ADVISE AND CALL PATIENT ONCE SHE HAS AN ACTIVE LAB ORDER, [phone]

## 2021-06-22 DIAGNOSIS — F41.9 ANXIETY: ICD-10-CM

## 2021-06-22 DIAGNOSIS — Z79.890 HORMONE REPLACEMENT THERAPY: Primary | ICD-10-CM

## 2021-06-22 DIAGNOSIS — I10 ESSENTIAL HYPERTENSION: ICD-10-CM

## 2021-06-22 DIAGNOSIS — E55.9 VITAMIN D DEFICIENCY: ICD-10-CM

## 2021-06-22 DIAGNOSIS — R73.09 ABNORMAL GLUCOSE: ICD-10-CM

## 2021-06-22 DIAGNOSIS — E78.2 MIXED HYPERLIPIDEMIA: ICD-10-CM

## 2021-06-22 DIAGNOSIS — F51.01 PRIMARY INSOMNIA: ICD-10-CM

## 2021-06-22 NOTE — TELEPHONE ENCOUNTER
Patient states she is going to have them done in [place] and she will go early in the morning the day of her appointment because her labs requires fasting

## 2021-06-28 ENCOUNTER — LAB (OUTPATIENT)
Dept: LAB | Facility: HOSPITAL | Age: 61
End: 2021-06-28

## 2021-06-28 ENCOUNTER — OFFICE VISIT (OUTPATIENT)
Dept: INTERNAL MEDICINE | Facility: CLINIC | Age: 61
End: 2021-06-28

## 2021-06-28 VITALS
SYSTOLIC BLOOD PRESSURE: 117 MMHG | HEIGHT: 66 IN | HEART RATE: 78 BPM | DIASTOLIC BLOOD PRESSURE: 52 MMHG | TEMPERATURE: 98.3 F | BODY MASS INDEX: 31.82 KG/M2 | WEIGHT: 198 LBS

## 2021-06-28 DIAGNOSIS — I10 ESSENTIAL HYPERTENSION: ICD-10-CM

## 2021-06-28 DIAGNOSIS — F41.9 ANXIETY: ICD-10-CM

## 2021-06-28 DIAGNOSIS — R73.09 ABNORMAL GLUCOSE: ICD-10-CM

## 2021-06-28 DIAGNOSIS — F51.01 PRIMARY INSOMNIA: ICD-10-CM

## 2021-06-28 DIAGNOSIS — E55.9 VITAMIN D DEFICIENCY: ICD-10-CM

## 2021-06-28 DIAGNOSIS — R63.5 WEIGHT GAIN: ICD-10-CM

## 2021-06-28 DIAGNOSIS — Z79.890 HORMONE REPLACEMENT THERAPY: ICD-10-CM

## 2021-06-28 DIAGNOSIS — E66.9 OBESITY (BMI 30-39.9): ICD-10-CM

## 2021-06-28 DIAGNOSIS — E78.2 MIXED HYPERLIPIDEMIA: ICD-10-CM

## 2021-06-28 DIAGNOSIS — Z00.00 ROUTINE MEDICAL EXAM: Primary | ICD-10-CM

## 2021-06-28 LAB
BASOPHILS # BLD AUTO: 0.05 10*3/MM3 (ref 0–0.2)
BASOPHILS NFR BLD AUTO: 0.7 % (ref 0–1.5)
DEPRECATED RDW RBC AUTO: 42.7 FL (ref 37–54)
EOSINOPHIL # BLD AUTO: 0.08 10*3/MM3 (ref 0–0.4)
EOSINOPHIL NFR BLD AUTO: 1.2 % (ref 0.3–6.2)
ERYTHROCYTE [DISTWIDTH] IN BLOOD BY AUTOMATED COUNT: 12.9 % (ref 12.3–15.4)
HBA1C MFR BLD: 6.5 % (ref 4.8–5.6)
HCT VFR BLD AUTO: 43.5 % (ref 34–46.6)
HGB BLD-MCNC: 14.4 G/DL (ref 12–15.9)
IMM GRANULOCYTES # BLD AUTO: 0.02 10*3/MM3 (ref 0–0.05)
IMM GRANULOCYTES NFR BLD AUTO: 0.3 % (ref 0–0.5)
LYMPHOCYTES # BLD AUTO: 1.87 10*3/MM3 (ref 0.7–3.1)
LYMPHOCYTES NFR BLD AUTO: 27.5 % (ref 19.6–45.3)
MCH RBC QN AUTO: 29.9 PG (ref 26.6–33)
MCHC RBC AUTO-ENTMCNC: 33.1 G/DL (ref 31.5–35.7)
MCV RBC AUTO: 90.4 FL (ref 79–97)
MONOCYTES # BLD AUTO: 0.48 10*3/MM3 (ref 0.1–0.9)
MONOCYTES NFR BLD AUTO: 7.1 % (ref 5–12)
NEUTROPHILS NFR BLD AUTO: 4.29 10*3/MM3 (ref 1.7–7)
NEUTROPHILS NFR BLD AUTO: 63.2 % (ref 42.7–76)
NRBC BLD AUTO-RTO: 0 /100 WBC (ref 0–0.2)
PLATELET # BLD AUTO: 239 10*3/MM3 (ref 140–450)
PMV BLD AUTO: 9.9 FL (ref 6–12)
RBC # BLD AUTO: 4.81 10*6/MM3 (ref 3.77–5.28)
WBC # BLD AUTO: 6.79 10*3/MM3 (ref 3.4–10.8)

## 2021-06-28 PROCEDURE — 83036 HEMOGLOBIN GLYCOSYLATED A1C: CPT

## 2021-06-28 PROCEDURE — 82306 VITAMIN D 25 HYDROXY: CPT

## 2021-06-28 PROCEDURE — 80061 LIPID PANEL: CPT

## 2021-06-28 PROCEDURE — 99214 OFFICE O/P EST MOD 30 MIN: CPT | Performed by: PHYSICIAN ASSISTANT

## 2021-06-28 PROCEDURE — 93000 ELECTROCARDIOGRAM COMPLETE: CPT | Performed by: PHYSICIAN ASSISTANT

## 2021-06-28 PROCEDURE — 85025 COMPLETE CBC W/AUTO DIFF WBC: CPT

## 2021-06-28 PROCEDURE — 82043 UR ALBUMIN QUANTITATIVE: CPT

## 2021-06-28 PROCEDURE — 84439 ASSAY OF FREE THYROXINE: CPT

## 2021-06-28 PROCEDURE — 82607 VITAMIN B-12: CPT

## 2021-06-28 PROCEDURE — 80053 COMPREHEN METABOLIC PANEL: CPT

## 2021-06-28 PROCEDURE — 99396 PREV VISIT EST AGE 40-64: CPT | Performed by: PHYSICIAN ASSISTANT

## 2021-06-28 PROCEDURE — 84443 ASSAY THYROID STIM HORMONE: CPT

## 2021-06-28 RX ORDER — PHENTERMINE HYDROCHLORIDE 15 MG/1
15 CAPSULE ORAL EVERY MORNING
Qty: 30 CAPSULE | Refills: 0 | Status: SHIPPED | OUTPATIENT
Start: 2021-06-28

## 2021-06-28 RX ORDER — ESTRADIOL 0.05 MG/D
1 FILM, EXTENDED RELEASE TRANSDERMAL 2 TIMES WEEKLY
Qty: 8 PATCH | Refills: 11 | Status: SHIPPED | OUTPATIENT
Start: 2021-06-28

## 2021-06-28 RX ORDER — PROGESTERONE 200 MG/1
200 CAPSULE ORAL NIGHTLY
Qty: 30 CAPSULE | Refills: 11 | Status: SHIPPED | OUTPATIENT
Start: 2021-06-28

## 2021-06-28 RX ORDER — PHENTERMINE HYDROCHLORIDE 37.5 MG/1
37.5 TABLET ORAL
Qty: 30 TABLET | Refills: 0 | Status: CANCELLED | OUTPATIENT
Start: 2021-06-28

## 2021-06-28 NOTE — PATIENT INSTRUCTIONS
"BMI for Adults  What is BMI?  Body mass index (BMI) is a number that is calculated from a person's weight and height. BMI can help estimate how much of a person's weight is composed of fat. BMI does not measure body fat directly. Rather, it is an alternative to procedures that directly measure body fat, which can be difficult and expensive.  BMI can help identify people who may be at higher risk for certain medical problems.  What are BMI measurements used for?  BMI is used as a screening tool to identify possible weight problems. It helps determine whether a person is obese, overweight, a healthy weight, or underweight.  BMI is useful for:  · Identifying a weight problem that may be related to a medical condition or may increase the risk for medical problems.  · Promoting changes, such as changes in diet and exercise, to help reach a healthy weight. BMI screening can be repeated to see if these changes are working.  How is BMI calculated?  BMI involves measuring your weight in relation to your height. Both height and weight are measured, and the BMI is calculated from those numbers. This can be done either in English (U.S[place] or metric measurements. Note that charts and online BMI calculators are available to help you find your BMI quickly and easily without having to do these calculations yourself.  To calculate your BMI in English (U.S[place] measurements:    1. Measure your weight in pounds (lb).  2. Multiply the number of pounds by 703.  ? For example, for a person who weighs 180 lb, multiply that number by 703, which equals 126,540.  3. Measure your height in inches. Then multiply that number by itself to get a measurement called \"inches squared.\"  ? For example, for a person who is 70 inches tall, the \"inches squared\" measurement is 70 inches x 70 inches, which equals 4,900 inches squared.  4. Divide the total from step 2 (number of lb x 703) by the total from step 3 (inches squared): 126,540 ÷ 4,900 = 25.8. This is " "your BMI.  To calculate your BMI in metric measurements:  1. Measure your weight in kilograms (kg).  2. Measure your height in meters (m). Then multiply that number by itself to get a measurement called \"meters squared.\"  ? For example, for a person who is 1.75 m tall, the \"meters squared\" measurement is 1.75 m x 1.75 m, which is equal to 3.1 meters squared.  3. Divide the number of kilograms (your weight) by the meters squared number. In this example: 70 ÷ 3.1 = 22.6. This is your BMI.  What do the results mean?  BMI charts are used to identify whether you are underweight, normal weight, overweight, or obese. The following guidelines will be used:  · Underweight: BMI less than 18.5.  · Normal weight: BMI between 18.5 and 24.9.  · Overweight: BMI between 25 and 29.9.  · Obese: BMI of 30 or above.  Keep these notes in mind:  · Weight includes both fat and muscle, so someone with a muscular build, such as an athlete, may have a BMI that is higher than 24.9. In cases like these, BMI is not an accurate measure of body fat.  · To determine if excess body fat is the cause of a BMI of 25 or higher, further assessments may need to be done by a health care provider.  · BMI is usually interpreted in the same way for men and women.  Where to find more information  For more information about BMI, including tools to quickly calculate your BMI, go to these websites:  · Centers for Disease Control and Prevention: www.cdc.gov  · American Heart Association: www.heart.org  · National Heart, Lung, and Blood [place]: www.nhlbi.nih.gov  Summary  · Body mass index (BMI) is a number that is calculated from a person's weight and height.  · BMI may help estimate how much of a person's weight is composed of fat. BMI can help identify those who may be at higher risk for certain medical problems.  · BMI can be measured using English measurements or metric measurements.  · BMI charts are used to identify whether you are underweight, normal " weight, overweight, or obese.  This information is not intended to replace advice given to you by your health care provider. Make sure you discuss any questions you have with your health care provider.  Document Revised: 09/09/2020 Document Reviewed: 07/17/2020  Elsevier Patient Education © 2021 [employer] Inc.

## 2021-06-28 NOTE — PROGRESS NOTES
Patient Care Team:  Kathryn Gillespie PA-C as PCP - General (Internal Medicine)    Chief Complaint::   Chief Complaint   Patient presents with   • Annual Exam        Subjective     HPI  Jami Pennington is a 61 year old female with history of hyperlipidemia, hypertension, insomnia, hormone replacement therapy, vitamin D deficiency, and anxiety.  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  She reports a gradual weight gain over the past 2 years.  Unable to pinpoint reason.  She reports she is a stress eater, and finds herself overeating.   Scheduled MMG for later today.  She denies pelvic pain, vaginal discharge, or bleeding.  She is compliant with her hormone therapy, taking Minivelle twice weekly and progesterone capsules daily.  History of insomnia, she takes Ambien as needed, but relies on hydroxyzine 25 mg nightly.  She has had both Covid vaccinations.  Fasting labs obtained earlier today.  She denies chest pain, shortness of breath, palpitations, or headaches.        The following portions of the patient's history were reviewed and updated as appropriate: active problem list, medication list, allergies, family history, social history    Review of Systems:   Review of Systems   Constitutional: Positive for unexpected weight gain. Negative for activity change, appetite change, diaphoresis, fatigue and unexpected weight loss.   HENT: Negative for hearing loss.    Eyes: Negative for blurred vision, double vision and visual disturbance.   Respiratory: Negative for chest tightness and shortness of breath.    Cardiovascular: Negative for chest pain, palpitations and leg swelling.   Gastrointestinal: Negative for abdominal pain, blood in stool, GERD and indigestion.   Endocrine: Negative for cold intolerance and heat intolerance.   Genitourinary: Negative for dysuria and hematuria.   Musculoskeletal: Negative for  "arthralgias and myalgias.   Skin: Negative for skin lesions.   Allergic/Immunologic: Negative for environmental allergies and food allergies.   Neurological: Negative for tremors, seizures, syncope, speech difficulty, weakness, headache, memory problem and confusion.   Hematological: Does not bruise/bleed easily.   Psychiatric/Behavioral: Negative for sleep disturbance and depressed mood. The patient is not nervous/anxious.        Vital Signs  Vitals:    06/28/21 1357   BP: 117/52   BP Location: Left arm   Patient Position: Sitting   Cuff Size: Adult   Pulse: 78   Temp: 98.3 °F (36.8 °C)   TempSrc: Temporal   Weight: 89.8 kg (198 lb)   Height: 168.5 cm (66.34\")   PainSc: 0-No pain     Body mass index is 31.63 kg/m².    Labs  No visits with results within 3 Month(s) from this visit.   Latest known visit with results is:   Lab on 06/20/2019   Component Date Value Ref Range Status   • Glucose 06/20/2019 98  65 - 99 mg/dL Final   • BUN 06/20/2019 10  6 - 20 mg/dL Final   • Creatinine 06/20/2019 0.63  0.57 - 1.00 mg/dL Final   • Sodium 06/20/2019 141  136 - 145 mmol/L Final   • Potassium 06/20/2019 4.1  3.5 - 5.2 mmol/L Final   • Chloride 06/20/2019 101  98 - 107 mmol/L Final   • CO2 06/20/2019 26.3  22.0 - 29.0 mmol/L Final   • Calcium 06/20/2019 9.5  8.6 - 10.5 mg/dL Final   • Total Protein 06/20/2019 7.2  6.0 - 8.5 g/dL Final   • Albumin 06/20/2019 4.70  3.50 - 5.20 g/dL Final   • ALT (SGPT) 06/20/2019 17  1 - 33 U/L Final   • AST (SGOT) 06/20/2019 17  1 - 32 U/L Final   • Alkaline Phosphatase 06/20/2019 88  39 - 117 U/L Final   • Total Bilirubin 06/20/2019 0.5  0.2 - 1.2 mg/dL Final   • eGFR Non African Amer 06/20/2019 97  >60 mL/min/1.73 Final   • Globulin 06/20/2019 2.5  gm/dL Final   • A/G Ratio 06/20/2019 1.9  g/dL Final   • BUN/Creatinine Ratio 06/20/2019 15.9  7.0 - 25.0 Final   • Anion Gap 06/20/2019 13.7  mmol/L Final   • Total Cholesterol 06/20/2019 133  0 - 200 mg/dL Final   • Triglycerides 06/20/2019 124  " 0 - 150 mg/dL Final   • HDL Cholesterol 06/20/2019 39* 40 - 60 mg/dL Final   • LDL Cholesterol  06/20/2019 69  0 - 100 mg/dL Final   • VLDL Cholesterol 06/20/2019 24.8  5 - 40 mg/dL Final   • LDL/HDL Ratio 06/20/2019 1.77   Final   • Microalbumin, Urine 06/20/2019 <1.2  mg/L Final   • 25 Hydroxy, Vitamin D 06/20/2019 22.9* 30.0 - 100.0 ng/ml Final   • TSH 06/20/2019 1.680  0.270 - 4.200 mIU/mL Final   • Vitamin B-12 06/20/2019 381  211 - 946 pg/mL Final   • Estradiol 06/20/2019 38.1  pg/mL Final   • WBC 06/20/2019 4.93  3.40 - 10.80 10*3/mm3 Final   • RBC 06/20/2019 4.63  3.77 - 5.28 10*6/mm3 Final   • Hemoglobin 06/20/2019 14.0  12.0 - 15.9 g/dL Final   • Hematocrit 06/20/2019 44.2  34.0 - 46.6 % Final   • MCV 06/20/2019 95.5  79.0 - 97.0 fL Final   • MCH 06/20/2019 30.2  26.6 - 33.0 pg Final   • MCHC 06/20/2019 31.7  31.5 - 35.7 g/dL Final   • RDW 06/20/2019 13.4  12.3 - 15.4 % Final   • RDW-SD 06/20/2019 46.8  37.0 - 54.0 fl Final   • MPV 06/20/2019 9.8  6.0 - 12.0 fL Final   • Platelets 06/20/2019 210  140 - 450 10*3/mm3 Final   • Neutrophil % 06/20/2019 53.2  42.7 - 76.0 % Final   • Lymphocyte % 06/20/2019 36.5  19.6 - 45.3 % Final   • Monocyte % 06/20/2019 7.3  5.0 - 12.0 % Final   • Eosinophil % 06/20/2019 1.6  0.3 - 6.2 % Final   • Basophil % 06/20/2019 1.0  0.0 - 1.5 % Final   • Immature Grans % 06/20/2019 0.4  0.0 - 0.5 % Final   • Neutrophils, Absolute 06/20/2019 2.62  1.70 - 7.00 10*3/mm3 Final   • Lymphocytes, Absolute 06/20/2019 1.80  0.70 - 3.10 10*3/mm3 Final   • Monocytes, Absolute 06/20/2019 0.36  0.10 - 0.90 10*3/mm3 Final   • Eosinophils, Absolute 06/20/2019 0.08  0.00 - 0.40 10*3/mm3 Final   • Basophils, Absolute 06/20/2019 0.05  0.00 - 0.20 10*3/mm3 Final   • Immature Grans, Absolute 06/20/2019 0.02  0.00 - 0.05 10*3/mm3 Final   • nRBC 06/20/2019 0.0  0.0 - 0.2 /100 WBC Final       Imaging  No radiology results for the last 30 days.      Current Outpatient Medications:   •  AMBIEN 10 MG tablet,  Take 0.5 tablets by mouth At Night As Needed., Disp: , Rfl:   •  amLODIPine (NORVASC) 5 MG tablet, Take 1 tablet by mouth Daily As Needed., Disp: , Rfl: 0  •  atorvastatin (LIPITOR) 80 MG tablet, Take 1 tablet by mouth Every Night., Disp: , Rfl: 1  •  carvedilol (COREG) 6.25 MG tablet, Take 1 tablet by mouth Daily., Disp: , Rfl: 1  •  estradiol (MINIVELLE, VIVELLE-DOT) 0.05 MG/24HR patch, Place 1 patch on the skin as directed by provider 2 (Two) Times a Week., Disp: 8 patch, Rfl: 11  •  hydrOXYzine pamoate (VISTARIL) 25 MG capsule, Take 1 capsule by mouth Every Night., Disp: , Rfl:   •  PARoxetine (PAXIL) 30 MG tablet, Take 1 tablet by mouth Daily., Disp: 30 tablet, Rfl: 5  •  phentermine 15 MG capsule, Take 1 capsule by mouth Every Morning., Disp: 30 capsule, Rfl: 0  •  Progesterone (PROMETRIUM) 200 MG capsule, Take 1 capsule by mouth Every Night., Disp: 30 capsule, Rfl: 11    Physical Exam:    Physical Exam  Vitals reviewed.   Constitutional:       Appearance: Normal appearance. She is well-developed.   HENT:      Head: Normocephalic and atraumatic.      Right Ear: Hearing, tympanic membrane, ear canal and external ear normal.      Left Ear: Hearing, tympanic membrane, ear canal and external ear normal.      Nose: Nose normal.      Mouth/Throat:      Mouth: Mucous membranes are moist.      Pharynx: Oropharynx is clear. Uvula midline.   Eyes:      General: Lids are normal.      Conjunctiva/sclera: Conjunctivae normal.      Pupils: Pupils are equal, round, and reactive to light.   Cardiovascular:      Rate and Rhythm: Normal rate and regular rhythm.      Pulses: Normal pulses.      Heart sounds: Normal heart sounds.   Pulmonary:      Effort: Pulmonary effort is normal.      Breath sounds: Normal breath sounds.   Abdominal:      General: Bowel sounds are normal.      Palpations: Abdomen is soft.   Musculoskeletal:         General: Normal range of motion.      Cervical back: Full passive range of motion without pain,  normal range of motion and neck supple.   Skin:     General: Skin is warm and dry.   Neurological:      General: No focal deficit present.      Mental Status: She is alert and oriented to person, place, and time. Mental status is at baseline.      Deep Tendon Reflexes: Reflexes are normal and symmetric.   Psychiatric:         Speech: Speech normal.         Behavior: Behavior normal.         Thought Content: Thought content normal.         Judgment: Judgment normal.           ECG 12 Lead    Date/Time: 6/28/2021 4:16 PM  Performed by: Kathryn Gillespie PA-C  Authorized by: Kathryn Gillespie PA-C   Comparison: not compared with previous ECG   Rhythm: sinus rhythm  BPM: 80  Other findings: T wave abnormality    Clinical impression: normal ECG                Assessment/Plan   Problem List Items Addressed This Visit        Cardiac and Vasculature    Hyperlipidemia    Overview     Continue atorvastatin 80mg daily.         Relevant Medications    atorvastatin (LIPITOR) 80 MG tablet    Hypertension    Overview     Continue amlodipine 5 mg tablets for now.  Try to cut back on sodium in the diet.         Relevant Medications    amLODIPine (NORVASC) 5 MG tablet    carvedilol (COREG) 6.25 MG tablet       Endocrine and Metabolic    Hormone replacement therapy    Overview     Continue minivelle as directed  Continue progesterone 200mg nightly.         Relevant Medications    Progesterone (PROMETRIUM) 200 MG capsule    estradiol (MINIVELLE, VIVELLE-DOT) 0.05 MG/24HR patch    Obesity (BMI 30-39.9)    Overview     Body mass index is 31.63 kg/m². Discussed importance of weight loss.  Recommend low fat, low calorie diet.  Recommend daily cardiovascular exercise. Goal is to lose 5 lbs in one month.            Vitamin D deficiency    Overview     Recheck levels today.         Weight gain    Overview     She has had gradual weight gain over the past 2 years.  Would like to try a low-dose pill to help with appetite.  Discussed  with patient.  We will try 1 month of phentermine 15 mg tablets daily.  Try and increase walking daily.         Relevant Medications    phentermine 15 MG capsule       Health Encounters    Routine medical exam - Primary    Overview     She has had both Covid vaccinations.  She is obtain fasting labs today.  Discussed Shingrix vaccination, patient will think about it.  She is due for mammogram later today.  Will consider bone density next year.  Current on colonoscopy.            Mental Health    Anxiety    Overview     Paxil 30mg daily.          Relevant Medications    PARoxetine (PAXIL) 30 MG tablet       Sleep    Primary insomnia    Overview     She does not take ambien every night.   Continue hydroxyzine 25 mg tablets at night.         Relevant Medications    hydrOXYzine pamoate (VISTARIL) 25 MG capsule    AMBIEN 10 MG tablet        Patient Wellness Counseling:   Plan of care reviewed with patient at the conclusion of today's visit. Education was provided in regards to diagnosis, diet and exercise, cervical cancer screening, self breast exams, breast cancer screening, and the importance of yearly mammograms.   Nutrition, physical activity, healthy weight,ways to reduce stress, adequate sleep, injury prevention, dental health, mental health, and immunizations.    Management and any prescribed or recommended OTC medications.  Patient verbalizes understanding of and agreement with management plan.    Return if symptoms worsen or fail to improve, for Next scheduled follow up.    Plan of care reviewed with patient at the conclusion of today's visit. Education was provided regarding diagnosis, management, and any prescribed or recommended OTC medications.Patient verbalizes understanding of and agreement with management plan.       Kathryn Gillespie PA-C    Please note that portions of this note were completed with a voice recognition program. Efforts were made to edit the dictations, but occasionally words are  mistranscribed.

## 2021-06-29 LAB
25(OH)D3 SERPL-MCNC: 32.8 NG/ML (ref 30–100)
ALBUMIN SERPL-MCNC: 4.4 G/DL (ref 3.5–5.2)
ALBUMIN UR-MCNC: <1.2 MG/DL
ALBUMIN/GLOB SERPL: 1.7 G/DL
ALP SERPL-CCNC: 94 U/L (ref 39–117)
ALT SERPL W P-5'-P-CCNC: 20 U/L (ref 1–33)
ANION GAP SERPL CALCULATED.3IONS-SCNC: 11.1 MMOL/L (ref 5–15)
AST SERPL-CCNC: 20 U/L (ref 1–32)
BILIRUB SERPL-MCNC: 0.8 MG/DL (ref 0–1.2)
BUN SERPL-MCNC: 10 MG/DL (ref 8–23)
BUN/CREAT SERPL: 17.2 (ref 7–25)
CALCIUM SPEC-SCNC: 9.6 MG/DL (ref 8.6–10.5)
CHLORIDE SERPL-SCNC: 103 MMOL/L (ref 98–107)
CHOLEST SERPL-MCNC: 148 MG/DL (ref 0–200)
CO2 SERPL-SCNC: 26.9 MMOL/L (ref 22–29)
CREAT SERPL-MCNC: 0.58 MG/DL (ref 0.57–1)
GFR SERPL CREATININE-BSD FRML MDRD: 106 ML/MIN/1.73
GLOBULIN UR ELPH-MCNC: 2.6 GM/DL
GLUCOSE SERPL-MCNC: 111 MG/DL (ref 65–99)
HDLC SERPL-MCNC: 35 MG/DL (ref 40–60)
LDLC SERPL CALC-MCNC: 80 MG/DL (ref 0–100)
LDLC/HDLC SERPL: 2.1 {RATIO}
POTASSIUM SERPL-SCNC: 4.3 MMOL/L (ref 3.5–5.2)
PROT SERPL-MCNC: 7 G/DL (ref 6–8.5)
SODIUM SERPL-SCNC: 141 MMOL/L (ref 136–145)
T4 FREE SERPL-MCNC: 0.93 NG/DL (ref 0.93–1.7)
TRIGL SERPL-MCNC: 197 MG/DL (ref 0–150)
TSH SERPL DL<=0.05 MIU/L-ACNC: 1.43 UIU/ML (ref 0.27–4.2)
VIT B12 BLD-MCNC: 472 PG/ML (ref 211–946)
VLDLC SERPL-MCNC: 33 MG/DL (ref 5–40)

## 2021-06-30 ENCOUNTER — TELEPHONE (OUTPATIENT)
Dept: INTERNAL MEDICINE | Facility: CLINIC | Age: 61
End: 2021-06-30

## 2021-07-01 ENCOUNTER — TELEPHONE (OUTPATIENT)
Dept: INTERNAL MEDICINE | Facility: CLINIC | Age: 61
End: 2021-07-01

## 2021-07-01 NOTE — TELEPHONE ENCOUNTER
I went ahead and sent her in the 15 mg dose.  This is the half dose of the regular 37.5 mg tablet.  Go ahead and take this whole pill daily.

## 2021-07-01 NOTE — TELEPHONE ENCOUNTER
----- Message from Kathryn Gillespie PA-C sent at 7/1/2021 11:14 AM EDT -----  (patient called for results-)  Overall, labs look good, except for blood sugar and triglycerides.  Both have went up a bit.  She has to start a regular walking program and she needs to cut back on sweets and processed foods.  Hopefully the new medication will help her.  We need to recheck this in 3 months.

## 2021-07-01 NOTE — TELEPHONE ENCOUNTER
Verbalized understanding  Also she stated she thought you had told her to take half tablet of the phentermine and a capsule was sent in and she can't half it

## 2022-12-05 ENCOUNTER — HOSPITAL ENCOUNTER (OUTPATIENT)
Age: 62
End: 2022-12-05
Payer: COMMERCIAL

## 2022-12-05 VITALS
SYSTOLIC BLOOD PRESSURE: 123 MMHG | DIASTOLIC BLOOD PRESSURE: 45 MMHG | RESPIRATION RATE: 18 BRPM | OXYGEN SATURATION: 95 % | HEART RATE: 78 BPM

## 2022-12-05 VITALS — BODY MASS INDEX: 31 KG/M2

## 2022-12-05 DIAGNOSIS — M51.36: Primary | ICD-10-CM

## 2022-12-05 DIAGNOSIS — Z79.899: ICD-10-CM

## 2022-12-05 DIAGNOSIS — M47.816: ICD-10-CM

## 2022-12-05 PROCEDURE — 99202 OFFICE O/P NEW SF 15 MIN: CPT

## 2022-12-05 PROCEDURE — G0463 HOSPITAL OUTPT CLINIC VISIT: HCPCS

## 2022-12-30 ENCOUNTER — HOSPITAL ENCOUNTER (OUTPATIENT)
Age: 62
Discharge: HOME | End: 2022-12-30
Payer: COMMERCIAL

## 2022-12-30 VITALS
RESPIRATION RATE: 20 BRPM | HEART RATE: 80 BPM | OXYGEN SATURATION: 99 % | SYSTOLIC BLOOD PRESSURE: 119 MMHG | TEMPERATURE: 97.7 F | DIASTOLIC BLOOD PRESSURE: 64 MMHG

## 2022-12-30 VITALS
SYSTOLIC BLOOD PRESSURE: 127 MMHG | OXYGEN SATURATION: 96 % | RESPIRATION RATE: 20 BRPM | DIASTOLIC BLOOD PRESSURE: 71 MMHG | HEART RATE: 73 BPM

## 2022-12-30 VITALS
SYSTOLIC BLOOD PRESSURE: 117 MMHG | OXYGEN SATURATION: 97 % | DIASTOLIC BLOOD PRESSURE: 60 MMHG | HEART RATE: 78 BPM | RESPIRATION RATE: 18 BRPM

## 2022-12-30 VITALS
DIASTOLIC BLOOD PRESSURE: 60 MMHG | RESPIRATION RATE: 18 BRPM | HEART RATE: 78 BPM | SYSTOLIC BLOOD PRESSURE: 117 MMHG | OXYGEN SATURATION: 97 %

## 2022-12-30 VITALS — BODY MASS INDEX: 30.5 KG/M2

## 2022-12-30 DIAGNOSIS — M47.26: ICD-10-CM

## 2022-12-30 DIAGNOSIS — M51.16: Primary | ICD-10-CM

## 2022-12-30 PROCEDURE — 64494 INJ PARAVERT F JNT L/S 2 LEV: CPT

## 2022-12-30 PROCEDURE — 64493 INJ PARAVERT F JNT L/S 1 LEV: CPT

## 2023-01-16 ENCOUNTER — HOSPITAL ENCOUNTER (OUTPATIENT)
Age: 63
End: 2023-01-16
Payer: COMMERCIAL

## 2023-01-16 VITALS — BODY MASS INDEX: 30.5 KG/M2

## 2023-01-16 VITALS
HEART RATE: 80 BPM | RESPIRATION RATE: 18 BRPM | DIASTOLIC BLOOD PRESSURE: 44 MMHG | SYSTOLIC BLOOD PRESSURE: 122 MMHG | OXYGEN SATURATION: 97 %

## 2023-01-16 DIAGNOSIS — M51.36: Primary | ICD-10-CM

## 2023-01-16 DIAGNOSIS — M47.816: ICD-10-CM

## 2023-01-16 PROCEDURE — 99212 OFFICE O/P EST SF 10 MIN: CPT

## 2023-01-16 PROCEDURE — G0463 HOSPITAL OUTPT CLINIC VISIT: HCPCS

## 2023-02-20 ENCOUNTER — HOSPITAL ENCOUNTER (OUTPATIENT)
Age: 63
End: 2023-02-20
Payer: COMMERCIAL

## 2023-02-20 DIAGNOSIS — M47.26: ICD-10-CM

## 2023-02-20 DIAGNOSIS — M51.16: Primary | ICD-10-CM

## 2023-02-20 PROCEDURE — 99212 OFFICE O/P EST SF 10 MIN: CPT

## 2023-02-20 PROCEDURE — G0463 HOSPITAL OUTPT CLINIC VISIT: HCPCS

## 2023-11-01 ENCOUNTER — OFFICE VISIT (OUTPATIENT)
Dept: ORTHOPEDIC SURGERY | Facility: CLINIC | Age: 63
End: 2023-11-01
Payer: COMMERCIAL

## 2023-11-01 VITALS — BODY MASS INDEX: 31.82 KG/M2 | HEIGHT: 66 IN | WEIGHT: 198 LBS

## 2023-11-01 DIAGNOSIS — I87.8 VENOUS STASIS: ICD-10-CM

## 2023-11-01 DIAGNOSIS — M76.62 ACHILLES TENDINITIS OF LEFT LOWER EXTREMITY: ICD-10-CM

## 2023-11-01 DIAGNOSIS — M25.572 LEFT ANKLE PAIN, UNSPECIFIED CHRONICITY: Primary | ICD-10-CM

## 2024-01-17 ENCOUNTER — OFFICE VISIT (OUTPATIENT)
Dept: ORTHOPEDIC SURGERY | Facility: CLINIC | Age: 64
End: 2024-01-17
Payer: COMMERCIAL

## 2024-01-17 VITALS
BODY MASS INDEX: 28.54 KG/M2 | HEIGHT: 68 IN | SYSTOLIC BLOOD PRESSURE: 136 MMHG | DIASTOLIC BLOOD PRESSURE: 72 MMHG | WEIGHT: 188.3 LBS

## 2024-01-17 DIAGNOSIS — M65.331 TRIGGER MIDDLE FINGER OF RIGHT HAND: Primary | ICD-10-CM

## 2024-01-17 RX ORDER — LANCETS 33 GAUGE
EACH MISCELLANEOUS
COMMUNITY
Start: 2023-11-27

## 2024-01-17 RX ORDER — TRIAMCINOLONE ACETONIDE 40 MG/ML
20 INJECTION, SUSPENSION INTRA-ARTICULAR; INTRAMUSCULAR
Status: COMPLETED | OUTPATIENT
Start: 2024-01-17 | End: 2024-01-17

## 2024-01-17 RX ORDER — BLOOD SUGAR DIAGNOSTIC
STRIP MISCELLANEOUS
COMMUNITY
Start: 2023-11-27

## 2024-01-17 RX ORDER — LIDOCAINE HYDROCHLORIDE 10 MG/ML
0.5 INJECTION, SOLUTION EPIDURAL; INFILTRATION; INTRACAUDAL; PERINEURAL
Status: COMPLETED | OUTPATIENT
Start: 2024-01-17 | End: 2024-01-17

## 2024-01-17 RX ORDER — ALPRAZOLAM 0.5 MG/1
TABLET ORAL AS NEEDED
COMMUNITY
Start: 2023-11-29

## 2024-01-17 RX ORDER — ZOLPIDEM TARTRATE 5 MG
TABLET ORAL
COMMUNITY
Start: 2023-12-27

## 2024-01-17 RX ORDER — SEMAGLUTIDE 0.68 MG/ML
INJECTION, SOLUTION SUBCUTANEOUS
COMMUNITY
Start: 2023-11-27

## 2024-01-17 RX ORDER — BLOOD-GLUCOSE METER
EACH MISCELLANEOUS
COMMUNITY
Start: 2023-11-27

## 2024-01-17 RX ADMIN — LIDOCAINE HYDROCHLORIDE 0.5 ML: 10 INJECTION, SOLUTION EPIDURAL; INFILTRATION; INTRACAUDAL; PERINEURAL at 13:29

## 2024-01-17 RX ADMIN — TRIAMCINOLONE ACETONIDE 20 MG: 40 INJECTION, SUSPENSION INTRA-ARTICULAR; INTRAMUSCULAR at 13:29

## 2024-01-17 NOTE — PROGRESS NOTES
[facility]     Office Visit       Date: 01/17/2024   Patient Name: Jami Pennington  MRN: [id number]  YOB: 1960    Referring Physician: No ref. provider found     Chief Complaint:   Chief Complaint   Patient presents with    Right Hand - Pain       History of Present Illness:   Jami Pennington is a 63 y.o. female RHD presents with pain of the right MF for approximately 1 month.  The patient reports catching of the finger without locking. The pain is worse with use and activity.  The patient has  received 1 corticosteroid injection(s) in the past with approximately 6-month relief of symptoms.    Relevant medical history includes hypertension and hyperlipidemia.  Patient is not currently working.  Patient denies smoking..           Subjective   Review of Systems:   Review of Systems   Constitutional: Negative.  Negative for chills, fatigue and fever.   HENT: Negative.  Negative for congestion and dental problem.    Eyes: Negative.  Negative for blurred vision.   Respiratory: Negative.  Negative for shortness of breath.    Cardiovascular: Negative.  Negative for leg swelling.   Gastrointestinal: Negative.  Negative for abdominal pain.   Endocrine: Negative.  Negative for polyuria.   Genitourinary: Negative.  Negative for difficulty urinating.   Musculoskeletal:  Positive for arthralgias.   Skin: Negative.    Allergic/Immunologic: Negative.    Neurological: Negative.    Hematological: Negative.  Negative for adenopathy.   Psychiatric/Behavioral: Negative.  Negative for behavioral problems.         Pertinent review of systems per HPI.     I reviewed the patient's chief complaint, history of present illness, review of systems, past medical history, surgical history, family history, social history, medications and allergy list in the EMR on 01/17/2024 and agree with the findings above.    Objective    Vital Signs:   Vitals:    01/17/24  "1314   BP: 136/72   Weight: 85.4 kg (188 lb 4.8 oz)   Height: 172.7 cm (68\")     BMI: BMI is >= 30 and <35. (Class 1 Obesity). The following options were offered after discussion;: weight loss educational material (shared in after visit summary)       General Appearance: No acute distress. Alert and oriented.     Chest:  Non-labored breathing on room air. Regular rate and rhythm.    Right upper Extremity Exam:    No palpable masses or visible lesions  Fingers are warm, well-perfused with appropriate capillary refill.  Palpable radial pulse.    Sensation intact to light touch in median, radial and ulnar nerve distributions.    Motor- Fires FPL, ulnar intrinsics, EPL/EDC w/ full active and passive range of motion. Strength intact.    Non-tender except for in the areas highlighted below    Tender to palpation at a1 pulley of the right middle finger with a palpable nodule.  There is  catching without locking with flexion of the digit.      Imaging/Studies:   Imaging Results (Last 24 Hours)       ** No results found for the last 24 hours. **            X-ray of the right hand 3 views from 6/27/2023 was independently reviewed by myself and demonstrates no evidence of mild right thumb CMC arthritis as well as mild joint space narrowing of the IP joints of all digits.    Procedures:  Procedures    Quality Measures:   ACP:   ACP discussion was declined by the patient. Patient does not have an advance directive, declines further assistance.    Tobacco:   Jami Pennington  reports that she has never smoked. She has never used smokeless tobacco..     Assessment / Plan    Assessment/Plan:     There are no diagnoses linked to this encounter.     Jami Penningtonis a 63 y.o. female who presents with:      ICD-10-CM ICD-9-CM   1. Trigger middle finger of right hand  M65.331 727.03         Regarding the patient's trigger finger(s), I discussed the pathophysiology of the patient's diagnosis as well as various treatment options including " corticosteroid injection, surgical release and night-time splints and anti-inflammatories.  The patient would like to proceed with corticosteroid injection today (2nd injection).    Procedure Note:    I discussed with the patient the potential benefits of performing therapeutic aspiration and injections as well as potential risks including but not limited to infection, swelling, pain, bleeding, bruising, nerve/vessel damage, skin color changes, transient elevation in blood glucose levels, and fat atrophy. After informed consent and after the areas were prepped with chlorhexadine soap, ethyl chloride was used to numb the skin. 0.5 mL of 1% lidocaine was injected followed by injection of 20mg of Kenalog into the A1 pulley of the right middle finger.  The patient tolerated the procedure well. There were no complications. A sterile dressing was placed over the injection sites.        Follow Up:   Return in about 3 months (around 4/17/2024), or if symptoms worsen or fail to improve.        Edd Rodriguez MD  [facility] Hand and Upper Extremity Surgeon

## 2024-01-17 NOTE — PROGRESS NOTES
Procedure   - Hand/Upper Extremity Injection: R long A1 for trigger finger on 1/17/2024 1:29 PM  Indications: pain  Details: 27 G (27G short) needle, volar approach  Medications: 20 mg triamcinolone acetonide 40 MG/ML; 0.5 mL lidocaine PF 1% 1 %  Outcome: tolerated well, no immediate complications  Procedure, treatment alternatives, risks and benefits explained, specific risks discussed. Consent was given by the patient. Immediately prior to procedure a time out was called to verify the correct patient, procedure, equipment, support staff and site/side marked as required. Patient was prepped and draped in the usual sterile fashion.

## 2024-09-26 NOTE — PROGRESS NOTES
"    [facility] Note    Subjective     CC: Follow-up and Pain of the Left Thumb (Left CMC Cortisone Injection given 07/23/2019)      HPI    Jami Pennington is a 59 y.o. female.  Patient returns today for follow-up evaluation of her left thumb.  She had been given a corticosteroid injection 7/23/2019 into the left thumb CMC joint.  This did help with some of her symptoms.  However patient continues to note pain along thenar.  Current pain scale 4/10.  Severity the pain mild to moderate.  Quality the pain throbbing.  Patient is currently using Advil for pain control.  She does also note stiffness along the dorsum of the thumb with inability to perform full opposition.  She denies any locking or triggering to the thumb.  No reported numbness or tingling.    ROS:    Constiutional:Pt denies fever, chills, nausea, or vomiting.  MSK:as above    Objective      Past Medical History  Past Medical History:   Diagnosis Date   • Hyperlipidemia     Per MD office progress note on 6/10/16   • Hypertension     Per MD office progress note on 6/10/16   • Overweight (BMI 25.0-29.9)     Per BMI and MD office note 6/10/16         Physical Exam  Pulse 74   Ht 168.5 cm (66.34\")   Wt 86 kg (189 lb 9.5 oz)   SpO2 98%   Breastfeeding? No   BMI 30.29 kg/m²     Body mass index is 30.29 kg/m².    Patient is well nourished and well developed.        Ortho Exam  Peripheral Vascular   Bilateral Upper Extremity    No cyanotic nail beds    Pink nail beds and rapid capillary refill   Palpation    Radial Pulse - Bilaterally normal    Neurologic   Sensory: Light touch intact- Right and left hand    Left Upper Extremity    Left wrist extensors: 5/5    Left wrist flexors: 5/5    Left intrinsics: 5/5   Right Upper Extremity    Right wrist extensors: 5/5    Right wrist flexors: 5/5    Right intrinsics: 5/5    Musculoskeletal     Inspection and Palpation   Left Wrist      Tenderness -none    Swelling - none    Crepitus - none    Muscle " tone - no atrophy     Functional Testing:         Left Wrist and Thumb    Finklestein's: Negative    CMC shuck: Negative    CMC grind: Negative    CMC tenderness: Mild discomfort    A1 pulley: Positive       Strength and Tone    Right  strength: good    Left  strength: good     Hand Exam/Etc: Decreased range of motion noted to the thumb especially with opposition.  Pulling sensation noted with range of motion along the dorsum of the thumb.      Imaging/Labs/EMG Reviewed:  Imaging Results (last 24 hours)     ** No results found for the last 24 hours. **      None today.      Assessment:  1. Pain of left thumb    2. Thumb joint stiffness    3. Decreased range of motion of left thumb        Plan:  1. Left thumb pain especially to the thenar with stiffness and decreased range of motion noted to the thumb.  2. We discussed proceeding with an injection along the A1 pulley of the left thumb which the patient accepted.  Injection was given today.  3. Provided patient with hand range of motion exercises.  Offered formal PT/OT but patient declined for now.  4. Recommend over-the-counter pain medication as needed.  5. Follow-up in 6 weeks for repeat evaluation, sooner if issues arise or symptoms worsen/change.  6. Questions and concerns answered.    After discussing the risks, benefits, indications of injection, the patient gave consent to proceed.  Her left thumb A1 pulley was confirmed as the correct site to be injected with a timeout.  It was then prepped using Hibiclens and injected with a mixture of 1/2 cc of 1% plain lidocaine and 1/2 cc of Celestone, without any resistance through the volar approach to A1 pulley, patient in seated position.  Area was cleaned, hemostasis was achieved and a Band-Aid was applied over the injection site.  The patient tolerated procedure well.  I instructed the patient on signs and symptoms of infection.  They should report to the emergency department or return to clinic if any of  these develop, for further evaluation and treatment.  Recommended modifying activity for the next 48 hours to include rest, ice, elevation and oral pain medication as needed.        Michelle Amin PA-C  09/09/19  1:40 PM   No

## 2024-12-04 ENCOUNTER — OFFICE VISIT (OUTPATIENT)
Dept: ORTHOPEDIC SURGERY | Facility: CLINIC | Age: 64
End: 2024-12-04
Payer: COMMERCIAL

## 2024-12-04 VITALS
WEIGHT: 176 LBS | HEIGHT: 68 IN | BODY MASS INDEX: 26.67 KG/M2 | SYSTOLIC BLOOD PRESSURE: 124 MMHG | DIASTOLIC BLOOD PRESSURE: 70 MMHG

## 2024-12-04 DIAGNOSIS — M65.331 TRIGGER MIDDLE FINGER OF RIGHT HAND: Primary | ICD-10-CM

## 2024-12-04 PROCEDURE — 99213 OFFICE O/P EST LOW 20 MIN: CPT | Performed by: PLASTIC SURGERY

## 2024-12-04 RX ORDER — HYDROXYZINE HYDROCHLORIDE 25 MG/1
25 TABLET, FILM COATED ORAL
COMMUNITY

## 2024-12-04 RX ORDER — NITROFURANTOIN 25; 75 MG/1; MG/1
100 CAPSULE ORAL DAILY
COMMUNITY
Start: 2024-11-27

## 2024-12-04 NOTE — PROGRESS NOTES
"                                                                 [facility]     Follow-up Office Visit       Date: 12/04/2024   Patient Name: Jami Pennington  MRN: [id number]  YOB: 1960    Chief Complaint:   Chief Complaint   Patient presents with    Follow-up     10.5 month follow up---Trigger middle finger of right hand       History of Present Illness:   Jami Pennington is a 64 y.o. female presents for follow-up of right middle finger trigger finger.  She previously had 2 corticosteroid injections with the last injection approximately 10 months ago.  Reports she started having pain and stiffness of the right middle finger approximately 1 month ago.  No new concerns.      Subjective   Review of Systems:   Review of Systems   Constitutional: Negative.  Negative for chills, fatigue and fever.   HENT: Negative.  Negative for congestion and dental problem.    Eyes: Negative.  Negative for blurred vision.   Respiratory: Negative.  Negative for shortness of breath.    Cardiovascular: Negative.  Negative for leg swelling.   Gastrointestinal: Negative.  Negative for abdominal pain.   Endocrine: Negative.  Negative for polyuria.   Genitourinary: Negative.  Negative for difficulty urinating.   Musculoskeletal:  Positive for arthralgias.   Skin: Negative.    Allergic/Immunologic: Negative.    Neurological: Negative.    Hematological: Negative.  Negative for adenopathy.   Psychiatric/Behavioral: Negative.  Negative for behavioral problems.         Pertinent review of systems per HPI    I reviewed the patient's chief complaint, history of present illness, review of systems, past medical history, surgical history, family history, social history, medications and allergy list in the EMR on 12/04/2024 and agree with the findings above.    Objective    Vital Signs:   Vitals:    12/04/24 1446   BP: 124/70   Weight: 79.8 kg (176 lb)   Height: 172.7 cm (67.99\")     BMI: Body mass index is 26.77 kg/m². "     General Appearance: No acute distress. Alert and oriented.     Chest:  Non-labored breathing on room air      Ortho Exam:  Palpation of the right middle finger over the A1 pulley.  There is catching with loss of active flexion due to pain.  Passive flexion intact.  Fingers warm and well-perfused distally  Sensation intact to light touch in the median, radial and ulnar nerve distributions    Imaging/Studies:   Imaging Results (Last 24 Hours)       ** No results found for the last 24 hours. **                Procedures:  Procedures    Quality Measures:   ACP:   ACP discussion was deferred.    Tobacco:   Jami Pennington  reports that she has never smoked. She has never used smokeless tobacco.    Assessment / Plan    Assessment/Plan:      Diagnosis Plan   1. Trigger middle finger of right hand            Presents for right middle finger trigger finger follow-up.  She had 2 previous corticosteroid injections and has had return of symptoms.  At this time I recommend right middle finger trigger finger release.  Patient would like to consider options and call the office if she decides to proceed with surgery.  Discussed the surgery in detail today.  Will plan to schedule her for surgery if the patient calls and agrees to it.    Follow Up:   No follow-ups on file.        Edd Rodriguez MD  [facility] [place] Hand and Upper Extremity Surgeon

## 2025-08-26 ENCOUNTER — OFFICE VISIT (OUTPATIENT)
Dept: ORTHOPEDIC SURGERY | Facility: CLINIC | Age: 65
End: 2025-08-26
Payer: COMMERCIAL

## 2025-08-26 VITALS
DIASTOLIC BLOOD PRESSURE: 54 MMHG | HEIGHT: 67 IN | WEIGHT: 173 LBS | BODY MASS INDEX: 27.15 KG/M2 | SYSTOLIC BLOOD PRESSURE: 112 MMHG

## 2025-08-26 DIAGNOSIS — M25.562 LEFT KNEE PAIN, UNSPECIFIED CHRONICITY: Primary | ICD-10-CM

## 2025-08-26 DIAGNOSIS — S89.92XA INJURY OF LEFT KNEE, INITIAL ENCOUNTER: ICD-10-CM

## 2025-08-26 DIAGNOSIS — M17.12 PRIMARY OSTEOARTHRITIS OF LEFT KNEE: ICD-10-CM

## 2025-08-26 RX ORDER — LIDOCAINE HYDROCHLORIDE 10 MG/ML
3 INJECTION, SOLUTION EPIDURAL; INFILTRATION; INTRACAUDAL; PERINEURAL
Status: COMPLETED | OUTPATIENT
Start: 2025-08-26 | End: 2025-08-26

## 2025-08-26 RX ORDER — DEXAMETHASONE SODIUM PHOSPHATE 4 MG/ML
4 INJECTION, SOLUTION INTRA-ARTICULAR; INTRALESIONAL; INTRAMUSCULAR; INTRAVENOUS; SOFT TISSUE
Status: COMPLETED | OUTPATIENT
Start: 2025-08-26 | End: 2025-08-26

## 2025-08-26 RX ORDER — MELOXICAM 15 MG/1
TABLET ORAL
Qty: 30 TABLET | Refills: 1 | Status: SHIPPED | OUTPATIENT
Start: 2025-08-26

## 2025-08-26 RX ADMIN — DEXAMETHASONE SODIUM PHOSPHATE 4 MG: 4 INJECTION, SOLUTION INTRA-ARTICULAR; INTRALESIONAL; INTRAMUSCULAR; INTRAVENOUS; SOFT TISSUE at 11:29

## 2025-08-26 RX ADMIN — LIDOCAINE HYDROCHLORIDE 3 ML: 10 INJECTION, SOLUTION EPIDURAL; INFILTRATION; INTRACAUDAL; PERINEURAL at 11:29
